# Patient Record
Sex: FEMALE | Race: BLACK OR AFRICAN AMERICAN | NOT HISPANIC OR LATINO | Employment: FULL TIME | URBAN - METROPOLITAN AREA
[De-identification: names, ages, dates, MRNs, and addresses within clinical notes are randomized per-mention and may not be internally consistent; named-entity substitution may affect disease eponyms.]

---

## 2024-01-02 ENCOUNTER — TELEPHONE (OUTPATIENT)
Dept: RESEARCH | Facility: CLINIC | Age: 49
End: 2024-01-02
Payer: COMMERCIAL

## 2024-01-02 NOTE — TELEPHONE ENCOUNTER
CRC contacted Pt regarding 1st appointment cancellation request. Pt asked to speak with HC later than 10 am on 1/5/2024 as she has a meeting at work. Pt, CRC, and HC Norma worked out a later appt time to accommodate Pt so appointment would not need to be cancelled. Pt confirmed new time.

## 2024-01-05 ENCOUNTER — PATIENT OUTREACH (OUTPATIENT)
Dept: RESEARCH | Facility: CLINIC | Age: 49
End: 2024-01-05
Payer: COMMERCIAL

## 2024-01-05 DIAGNOSIS — Z00.6 RESEARCH SUBJECT: ICD-10-CM

## 2024-01-05 NOTE — PROGRESS NOTES
01/05/2024  11:28 AM    Patient Name Reina Ortiz   Appointment Department [facility]NAG[facility]  Visit Type Audio (Virtual visit)    Clinic Weights   Wt Readings from Last 3 Encounters:   11/20/23 1141 90.7 kg (199 lb 15.3 oz)   10/10/23 0911 88.9 kg (196 lb)   07/28/23 1513 90.8 kg (200 lb 2.8 oz)     Last Reported Weights No data was found      [facility] INTERVENTION CONTACT:   Method of contact:  Phone Call   or Participant-Initiated Contact?:  -initiated contact  Type of intervention Contact:  Scheduled Session  Did the participant attend session?: Yes    Was the patient called within 15 minutes of the scheduled appointment?:  Yes  Is this a make-up session?: No    Which session materials covered in session?:  Session 1  Patient Reported Weight (From External Justin):  195  Time workout: none reported.  Average Daily Steps: none reported.  Calorie Prescription::  1800  Safety Criteria Triggered:  None  Toolbox Triggered:  None  Weight Graph Stoplight Status for Dietary Adherence:  Green Light       Goals         80 <= Glucose <= 180 (pt-stated)       Blood Pressure < 130/80 (pt-stated)       Exercise at least 150 minutes per week.       HEMOGLOBIN A1C < 8.0       Increase walking/biking to 10 minutes per day (pt-stated)       Take at least one BP reading per week at various times of the day       Weight (lb) < 90.7 kg (200 lb) (pt-stated)              Additional Notes:    Ppt only able to meet for 30 minutes today - spent the majority of the call discussing the meal plan. Will go over the weight graph, stop lights, and tool box next week. Ppt shared her motivation for joining the study - has had diabetes for 26 years and wants to lose weight to better control her blood sugar. She is also motivated to get off some medication and overall be healthier. Currently has a [employer] fitness watch that tracks her steps. We will review them next week to set a daily step goal. Plans to go grocery  shopping for PCFs in the new few days.           Norma Schilling MS, RD, LDN  [facility]   [phone]

## 2024-01-12 ENCOUNTER — PATIENT OUTREACH (OUTPATIENT)
Dept: RESEARCH | Facility: CLINIC | Age: 49
End: 2024-01-12
Payer: COMMERCIAL

## 2024-01-12 NOTE — PROGRESS NOTES
01/12/2024  9:54 AM    Patient Name Reina Ortiz   Appointment Department [facility]  Visit Type Audio (Virtual visit)    Clinic Weights   Wt Readings from Last 3 Encounters:   11/20/23 1141 90.7 kg (199 lb 15.3 oz)   10/10/23 0911 88.9 kg (196 lb)   07/28/23 1513 90.8 kg (200 lb 2.8 oz)     Last Reported Weights No data was found      [facility] INTERVENTION CONTACT:   Method of contact:  Phone Call   or Participant-Initiated Contact?:  -initiated contact  Type of intervention Contact:  Scheduled Session  Did the participant attend session?: Yes    Was the patient called within 15 minutes of the scheduled appointment?:  Yes  Is this a make-up session?: No    Which session materials covered in session?:  Session 2  Patient Reported Weight (From External Justin):  193.1  Patient-reported weekly minutes of physical activity::  0  Average daily steps per day via Fitbit or activity tracker measurement::  4024  Calorie Prescription::  1800  Safety Criteria Triggered:  None  Toolbox Triggered:  None  Weight Graph Stoplight Status for Dietary Adherence:  Green Light       Goals         80 <= Glucose <= 180 (pt-stated)       Blood Pressure < 130/80 (pt-stated)       Exercise at least 150 minutes per week.       HEMOGLOBIN A1C < 8.0       Increase walking/biking to 10 minutes per day (pt-stated)       Take at least one BP reading per week at various times of the day       Weight (lb) < 90.7 kg (200 lb) (pt-stated)              Additional Notes:    Patient reports Doing Well. Patient is Highly Motivated with being 193.1 pounds today and aims to Get Below that weight next week. Logged into weight graph website together and reviewed the weight graph, stop lights, and toolbox.  Shared that it was challenging, but she was able to follow the PCFs this past week. Sent some time reviewing the meal plan checklist again since she messaged about needing clarification. Patient's plans for eating this week  include Packing healthy snacks and Declining unhealthy options. Ppt is going on a cruise from the 13th-17th and will not be able to bring her scale and take weights. She plans to pack single serving snacks and make healthy choices and be mindful of portion sizes of the meals served. For exercise, patient plans to continue aiming to walk at least 4,000 steps a day. Her average this week was 4,024, and she believes she can achieve this goal.               Norma Schilling, MS, RD, LDN  [facility]   [phone]

## 2024-01-26 ENCOUNTER — PATIENT OUTREACH (OUTPATIENT)
Dept: RESEARCH | Facility: CLINIC | Age: 49
End: 2024-01-26
Payer: COMMERCIAL

## 2024-01-26 NOTE — PROGRESS NOTES
01/26/2024  10:00 AM    Patient Name Reina Ortiz   Appointment Department [facility]  Visit Type Audio (Virtual visit)    Clinic Weights   Wt Readings from Last 3 Encounters:   11/20/23 1141 90.7 kg (199 lb 15.3 oz)   10/10/23 0911 88.9 kg (196 lb)   07/28/23 1513 90.8 kg (200 lb 2.8 oz)     Last Reported Weights No data was found      [facility] INTERVENTION CONTACT:   Method of contact:  Phone Call   or Participant-Initiated Contact?:  -initiated contact  Type of intervention Contact:  Scheduled Session  Did the participant attend session?: Yes    Was the patient called within 15 minutes of the scheduled appointment?:  Yes  Is this a make-up session?: No    Which session materials covered in session?:  Session 3 and Session 4  Patient Reported Weight (From External Justin):  195.6  Patient-reported weekly minutes of physical activity::  0  Average daily steps per day via Fitbit or activity tracker measurement::  3239  Calorie Prescription::  1800  Safety Criteria Triggered:  None  Toolbox Triggered:  Adherence to diet  Adherence to diet: Health  must choose at least two interventions from list::  Sole use of a structured meal plan checklist given to ppt in their first intervention session and Participant to consume only meal replacements and portion-controlled foods as prescribed  Weight Graph Stoplight Status for Dietary Adherence:  Red Light       Goals         80 <= Glucose <= 180 (pt-stated)       Blood Pressure < 130/80 (pt-stated)       Exercise at least 150 minutes per week.       HEMOGLOBIN A1C < 8.0       Increase walking/biking to 10 minutes per day (pt-stated)       Take at least one BP reading per week at various times of the day       Weight (lb) < 90.7 kg (200 lb) (pt-stated)              Additional Notes:    Patient reports Doing Well. Patient is Poorly Motivated with being 195.6 pounds today and aims to Get Below that weight next week. Ppt is aware that her  weight has gone up over the past 2 weeks. She was on a cruise and notes that her weight increase began after that. She also thinks some weight gain could be d/t menstrual cycle. Wants to follow the meal plan checklist this next week since she was off a week with the cruise. Patient's plans for eating this week include Buying pre-packaged meals, Packing healthy snacks, and Declining unhealthy options. For exercise, patient plans to continue aiming to walk about 4,000 steps. She was close to that goal several days this week - reviewed checking her smart watch periodically throughout the day to see if her steps are looking good. From there, she can determine if she needs to add in extra efforts to walk around.             Norma Schilling MS, RD, LDN  [facility]   [phone]

## 2024-01-29 DIAGNOSIS — E11.65 TYPE 2 DIABETES MELLITUS WITH HYPERGLYCEMIA, WITHOUT LONG-TERM CURRENT USE OF INSULIN: ICD-10-CM

## 2024-01-29 RX ORDER — METFORMIN HYDROCHLORIDE 1000 MG/1
1000 TABLET ORAL 2 TIMES DAILY WITH MEALS
Qty: 180 TABLET | Refills: 3 | Status: SHIPPED | OUTPATIENT
Start: 2024-01-29

## 2024-01-29 NOTE — TELEPHONE ENCOUNTER
No care due was identified.  [facility] Embedded Care Due Messages. Reference number: [id number].   1/29/2024 6:47:40 AM CST

## 2024-01-29 NOTE — TELEPHONE ENCOUNTER
Refill Routing Note   Medication(s) are not appropriate for processing by [facility] for the following reason(s):        No active prescription written by provider    [facility] action(s):  Defer        Medication Therapy Plan:  on the med list 24.      Appointments  past 12m or future 3m with PCP    Date Provider   Last Visit   2023 Myles Sanderson MD   Next Visit   2024 Myles Sanderson MD   ED visits in past 90 days: 0        Note composed:12:54 PM 2024

## 2024-01-31 ENCOUNTER — PATIENT MESSAGE (OUTPATIENT)
Dept: OBSTETRICS AND GYNECOLOGY | Facility: CLINIC | Age: 49
End: 2024-01-31
Payer: COMMERCIAL

## 2024-02-02 ENCOUNTER — PATIENT OUTREACH (OUTPATIENT)
Dept: RESEARCH | Facility: CLINIC | Age: 49
End: 2024-02-02
Payer: COMMERCIAL

## 2024-02-02 NOTE — PROGRESS NOTES
02/02/2024  9:59 AM    Patient Name Reina Ortiz   Appointment Department [facility]  Visit Type Audio (Virtual visit)    Clinic Weights   Wt Readings from Last 3 Encounters:   11/20/23 1141 90.7 kg (199 lb 15.3 oz)   10/10/23 0911 88.9 kg (196 lb)   07/28/23 1513 90.8 kg (200 lb 2.8 oz)     Last Reported Weights No data was found      [facility] INTERVENTION CONTACT:   Method of contact:  Phone Call   or Participant-Initiated Contact?:  -initiated contact  Type of intervention Contact:  Scheduled Session  Did the participant attend session?: Yes    Was the patient called within 15 minutes of the scheduled appointment?:  Yes  Is this a make-up session?: No    Which session materials covered in session?:  Session 5  Patient Reported Weight (From External Justin):  192  Patient-reported weekly minutes of physical activity::  0  Average daily steps per day via Fitbit or activity tracker measurement::  3967  Calorie Prescription::  1800  Safety Criteria Triggered:  None  Toolbox Triggered:  None  Weight Graph Stoplight Status for Dietary Adherence:  Yellow Light - In the Zone       Goals         80 <= Glucose <= 180 (pt-stated)       Blood Pressure < 130/80 (pt-stated)       Exercise at least 150 minutes per week.       HEMOGLOBIN A1C < 8.0       Increase walking/biking to 10 minutes per day (pt-stated)       Take at least one BP reading per week at various times of the day       Weight (lb) < 90.7 kg (200 lb) (pt-stated)              Additional Notes:    Patient reports Doing Well. Patient is Highly Motivated with being 192 pounds today and aims to Get Below that weight next week. Overall lost 3.6 pounds this past week! Ppt plans to continue following the meal plan checklist this week. Patient's plans for eating this week include Buying pre-packaged meals, Packing healthy snacks, and Declining unhealthy options. For exercise, patient plans to continue aiming to walk 4,000 steps a day.  She reached this goal for 4 out of the 6 days she tracked her steps. Wants to continue with this goal and consider adding in walking videos after next week.            Norma Schilling MS, RD, LDN  [facility]   [phone]

## 2024-02-05 ENCOUNTER — PATIENT OUTREACH (OUTPATIENT)
Dept: RESEARCH | Facility: CLINIC | Age: 49
End: 2024-02-05
Payer: COMMERCIAL

## 2024-02-05 NOTE — PROGRESS NOTES
02/05/2024  9:57 AM    Patient Name Reina Ortiz   Appointment Department [facility]  Visit Type Audio (Virtual visit)    Clinic Weights   Wt Readings from Last 3 Encounters:   11/20/23 1141 90.7 kg (199 lb 15.3 oz)   10/10/23 0911 88.9 kg (196 lb)   07/28/23 1513 90.8 kg (200 lb 2.8 oz)     Last Reported Weights No data was found      [facility] INTERVENTION CONTACT:   Method of contact:  Phone Call   or Participant-Initiated Contact?:  -initiated contact  Type of intervention Contact:  Scheduled Session  Did the participant attend session?: Yes    Was the patient called within 15 minutes of the scheduled appointment?:  Yes  Is this a make-up session?: No    Which session materials covered in session?:  Session 6  Patient Reported Weight (From External Justin):  190.9  Patient-reported weekly minutes of physical activity::  0  Average daily steps per day via Fitbit or activity tracker measurement::  4000  Calorie Prescription::  1800  Safety Criteria Triggered:  None  Toolbox Triggered:  None  Weight Graph Stoplight Status for Dietary Adherence:  Green Light       Goals         80 <= Glucose <= 180 (pt-stated)       Blood Pressure < 130/80 (pt-stated)       Exercise at least 150 minutes per week.       HEMOGLOBIN A1C < 8.0       Increase walking/biking to 10 minutes per day (pt-stated)       Take at least one BP reading per week at various times of the day       Weight (lb) < 90.7 kg (200 lb) (pt-stated)              Additional Notes:    Patient reports Doing Well. Patient is Highly Motivated with being 190.9 pounds today and aims to Get Below that weight next week. She is still motivated to follow the meal plan checklist this next week. Patient's plans for eating this week include Buying pre-packaged meals, Packing healthy snacks, and Declining unhealthy options. Ppt shared that she feels confident about her ability to read the nutrition labels. Reviewed the hand estimating tips for  portion sizes. For exercise, patient plans to continue aiming for the goal of walking 4,000 steps daily. Reminded her to check her smart watch periodically to see if she needs to move more to meet that goal by the end of the day.             Norma Schilling MS, RD, LDN  [facility]   [phone]

## 2024-02-16 ENCOUNTER — PATIENT OUTREACH (OUTPATIENT)
Dept: RESEARCH | Facility: CLINIC | Age: 49
End: 2024-02-16
Payer: COMMERCIAL

## 2024-02-16 NOTE — PROGRESS NOTES
02/16/2024  10:28 AM    Patient Name Reina Ortiz   Appointment Department [facility]  Visit Type Audio (Virtual visit)    Clinic Weights   Wt Readings from Last 3 Encounters:   11/20/23 1141 90.7 kg (199 lb 15.3 oz)   10/10/23 0911 88.9 kg (196 lb)   07/28/23 1513 90.8 kg (200 lb 2.8 oz)     Last Reported Weights No data was found      [facility] INTERVENTION CONTACT:   Method of contact:  Phone Call   or Participant-Initiated Contact?:  -initiated contact  Type of intervention Contact:  Scheduled Session  Did the participant attend session?: Yes    Was the patient called within 15 minutes of the scheduled appointment?:  Yes  Is this a make-up session?: No    Which session materials covered in session?:  Session 7  Patient Reported Weight (From External Justin):  188.9  Patient-reported weekly minutes of physical activity::  0  Average Daily Steps: none reported.  Calorie Prescription::  1800  Safety Criteria Triggered:  None  Toolbox Triggered:  None  Weight Graph Stoplight Status for Dietary Adherence:  Green Light       Goals         80 <= Glucose <= 180 (pt-stated)       Blood Pressure < 130/80 (pt-stated)       Exercise at least 150 minutes per week.       HEMOGLOBIN A1C < 8.0       Increase walking/biking to 10 minutes per day (pt-stated)       Take at least one BP reading per week at various times of the day       Weight (lb) < 90.7 kg (200 lb) (pt-stated)              Additional Notes:    Patient reports Doing Well. Patient is Highly Motivated with being 188.9 pounds today and aims to Get Below that weight next week. Shared that she started to incorporate cooking meals at home along with having the portion-controlled meals. Will focus on increasing her vegetable consumption since she claims to not be a fan of them (does not like cauliflower). Willing to try cooking items like asparagus and green beans in the air fryer or steamer. We also discussed importance of measuring oil  and using oil spray instead. Patient's plans for eating this week include Buying pre-packaged meals, Packing healthy snacks, and Declining unhealthy options. For exercise, patient plans to continue wearing her [employer] watch and trying to walk 4,000 steps daily. Was unable to download her step count for this session.             Norma Schilling MS, RD, LDN  [facility]   [phone]

## 2024-02-23 ENCOUNTER — PATIENT OUTREACH (OUTPATIENT)
Dept: RESEARCH | Facility: CLINIC | Age: 49
End: 2024-02-23
Payer: COMMERCIAL

## 2024-02-23 NOTE — PROGRESS NOTES
02/23/2024  10:00 AM    Patient Name Reina Ortiz   Appointment Department [facility]NAG[facility]  Visit Type Audio (Virtual visit)    Clinic Weights   Wt Readings from Last 3 Encounters:   11/20/23 1141 90.7 kg (199 lb 15.3 oz)   10/10/23 0911 88.9 kg (196 lb)   07/28/23 1513 90.8 kg (200 lb 2.8 oz)     Last Reported Weights No data was found      [facility] INTERVENTION CONTACT:   Method of contact:  Phone Call   or Participant-Initiated Contact?:  -initiated contact  Type of intervention Contact:  Scheduled Session  Did the participant attend session?: Yes    Was the patient called within 15 minutes of the scheduled appointment?:  Yes  Is this a make-up session?: No    Which session materials covered in session?:  Session 8  Patient Reported Weight (From External Justin):  188.9  Time workout: none reported.  Average daily steps per day via Fitbit or activity tracker measurement::  4631  Calorie Prescription::  1800  Safety Criteria Triggered:  None  Toolbox Triggered:  None  Weight Graph Stoplight Status for Dietary Adherence:  Green Light       Goals         80 <= Glucose <= 180 (pt-stated)       Blood Pressure < 130/80 (pt-stated)       Exercise at least 150 minutes per week.       HEMOGLOBIN A1C < 8.0       Increase walking/biking to 10 minutes per day (pt-stated)       Take at least one BP reading per week at various times of the day       Weight (lb) < 90.7 kg (200 lb) (pt-stated)              Additional Notes:    Patient reports Doing Well. Patient is Highly Motivated with being 188.9 pounds today and aims to Get Below that weight next week. Patient's plans for eating this week include Packing healthy snacks and Declining unhealthy options. Still cooking her meals at home and made an effort to incorporate more vegetables this week. Tried out asparagus, sweet potatoes, and bell peppers in the air fryer. For exercise, patient plans to increase her daily step goal from 4,000 to 4,500 steps  daily. This past week she walked over 4,500 steps 4 out of the 7 days, so we reviewed that she is capable of this new goal.             Norma Schilling MS, RD, LDN  [employer]   [phone]

## 2024-03-01 ENCOUNTER — PATIENT OUTREACH (OUTPATIENT)
Dept: RESEARCH | Facility: CLINIC | Age: 49
End: 2024-03-01
Payer: COMMERCIAL

## 2024-03-01 NOTE — PROGRESS NOTES
03/01/2024  9:56 AM    Patient Name Reina Ortiz   Appointment Department [facility]NAG[facility]  Visit Type Audio (Virtual visit)    Clinic Weights   Wt Readings from Last 3 Encounters:   11/20/23 1141 90.7 kg (199 lb 15.3 oz)   10/10/23 0911 88.9 kg (196 lb)   07/28/23 1513 90.8 kg (200 lb 2.8 oz)     Last Reported Weights No data was found      [facility] INTERVENTION CONTACT:   Method of contact:  Phone Call   or Participant-Initiated Contact?:  -initiated contact  Type of intervention Contact:  Scheduled Session  Did the participant attend session?: Yes    Was the patient called within 15 minutes of the scheduled appointment?:  Yes  Is this a make-up session?: No    Which session materials covered in session?:  Session 9  Patient Reported Weight (From External Justin):  184.7  Time workout: not reported.  Average daily steps per day via Fitbit or activity tracker measurement::  5351  Calorie Prescription::  1800  Safety Criteria Triggered:  None  Toolbox Triggered:  None  Weight Graph Stoplight Status for Dietary Adherence:  Green Light       Goals         80 <= Glucose <= 180 (pt-stated)       Blood Pressure < 130/80 (pt-stated)       Exercise at least 150 minutes per week.       HEMOGLOBIN A1C < 8.0       Increase walking/biking to 10 minutes per day (pt-stated)       Take at least one BP reading per week at various times of the day       Weight (lb) < 90.7 kg (200 lb) (pt-stated)              Additional Notes:    Patient reports Doing Well. Patient is Highly Motivated with being 184.7 pounds today and aims to Get Below that weight next week. She lost about 4 pounds this past week - contributes this to increased walking. Patient's plans for eating this week include Buying pre-packaged meals and Packing healthy snacks. She made an effort to eat more vegetables and keeps a couple portion-controlled meals for days when she is short on time. For exercise, patient plans to continue walking her  dog daily and aiming to get at least 4,500 steps. Her lowest day was 4,081 steps and highest was 6,442 steps - she walked over 4,500 steps 6 out of 7 days last week. Has noticed that she can a difference with clothes fitting more loose since losing about 10 pounds.          Norma Schilling MS, RD, LDN  [facility]   [phone]

## 2024-03-08 ENCOUNTER — PATIENT OUTREACH (OUTPATIENT)
Dept: RESEARCH | Facility: CLINIC | Age: 49
End: 2024-03-08
Payer: COMMERCIAL

## 2024-03-08 NOTE — PROGRESS NOTES
03/08/2024  9:59 AM    Patient Name Reina Ortiz   Appointment Department [facility] PROP WEIGHT MANAGEMENT  Visit Type Audio (Virtual visit)    Clinic Weights   Wt Readings from Last 3 Encounters:   11/20/23 1141 90.7 kg (199 lb 15.3 oz)   10/10/23 0911 88.9 kg (196 lb)   07/28/23 1513 90.8 kg (200 lb 2.8 oz)     Last Reported Weights No data was found      [facility] INTERVENTION CONTACT:   Method of contact:  Phone Call   or Participant-Initiated Contact?:  -initiated contact  Type of intervention Contact:  Scheduled Session  Did the participant attend session?: No    If no, please select a reason::  Other (please comment) (unknown)  Was the patient called within 15 minutes of the scheduled appointment?:  Yes  Is this a make-up session?: No    Safety Criteria Triggered:  None  Weight Graph Stoplight Status for Dietary Adherence:  Green Light       Goals         80 <= Glucose <= 180 (pt-stated)       Blood Pressure < 130/80 (pt-stated)       Exercise at least 150 minutes per week.       HEMOGLOBIN A1C < 8.0       Increase walking/biking to 10 minutes per day (pt-stated)       Take at least one BP reading per week at various times of the day       Weight (lb) < 90.7 kg (200 lb) (pt-stated)              Additional Notes:    Unable to successfully reach patient for our scheduled session. Left a voicemail and sent a missed appt text alert.          Norma Schilling MS, RD, LDN  [employer] [facility]   [phone]

## 2024-03-22 ENCOUNTER — PATIENT OUTREACH (OUTPATIENT)
Dept: RESEARCH | Facility: CLINIC | Age: 49
End: 2024-03-22
Payer: COMMERCIAL

## 2024-03-22 NOTE — PROGRESS NOTES
03/22/2024  10:01 AM    Patient Name Reina Ortiz   Appointment Department [facility]  Visit Type Audio (Virtual visit)    Clinic Weights   Wt Readings from Last 3 Encounters:   11/20/23 1141 90.7 kg (199 lb 15.3 oz)   10/10/23 0911 88.9 kg (196 lb)   07/28/23 1513 90.8 kg (200 lb 2.8 oz)     Last Reported Weights No data was found      [facility] INTERVENTION CONTACT:   Method of contact:  Phone Call   or Participant-Initiated Contact?:  -initiated contact  Type of intervention Contact:  Scheduled Session  Did the participant attend session?: Yes    Was the patient called within 15 minutes of the scheduled appointment?:  Yes  Is this a make-up session?: No    Which session materials covered in session?:  Session 10 and Session 11  Patient Reported Weight (From External Justin):  180.3  Time workout: not reported.  Average daily steps per day via Fitbit or activity tracker measurement::  5919  Calorie Prescription::  1800  Safety Criteria Triggered:  None  Toolbox Triggered:  None  Weight Graph Stoplight Status for Dietary Adherence:  Green Light       Goals         80 <= Glucose <= 180 (pt-stated)       Blood Pressure < 130/80 (pt-stated)       Exercise at least 150 minutes per week.       HEMOGLOBIN A1C < 8.0       Increase walking/biking to 10 minutes per day (pt-stated)       Take at least one BP reading per week at various times of the day       Weight (lb) < 90.7 kg (200 lb) (pt-stated)              Additional Notes:    Patient reports Doing Well. Patient is Highly Motivated with being 180.3 pounds today and aims to Get Below that weight next week. Patient's plans for eating this week include Buying pre-packaged meals, Packing healthy snacks, and Declining unhealthy options. She keeps several single serving meals in the house as a quick option if needed. For meals, she has mostly been preparing fish or shrimp with a salad or two vegetable sides - typically has fruit for a snack  as well. Shared that she rarely eats fast food and if she does, she will order a kid's meal. Today we reviewed sources of carbs and patient has sweet potatoes and oatmeal regularly. If she does have white rice, she makes it a priority to stay underneath the serving size. For exercise, patient plans to continue walking daily and reaching her step goal - reached her goal 6 out of 7 days this past week. She intentionally chino her car far away when running errands. Patient shared that she has noticed her energy levels increase, and she is feeling overall excited and motivated with her progress thus far.          Norma Schilling MS, RD, LDN  [facility]   [phone]

## 2024-04-05 ENCOUNTER — PATIENT OUTREACH (OUTPATIENT)
Dept: RESEARCH | Facility: CLINIC | Age: 49
End: 2024-04-05
Payer: COMMERCIAL

## 2024-04-05 NOTE — PROGRESS NOTES
04/05/2024  10:00 AM    Patient Name Reina Otriz   Appointment Department [facility]  Visit Type Audio (Virtual visit)    Clinic Weights   Wt Readings from Last 3 Encounters:   11/20/23 1141 90.7 kg (199 lb 15.3 oz)   10/10/23 0911 88.9 kg (196 lb)   07/28/23 1513 90.8 kg (200 lb 2.8 oz)     Last Reported Weights No data was found      [facility] INTERVENTION CONTACT:   Method of contact:  Phone Call   or Participant-Initiated Contact?:  -initiated contact  Type of intervention Contact:  Scheduled Session  Did the participant attend session?: Yes    Was the patient called within 15 minutes of the scheduled appointment?:  Yes  Is this a make-up session?: No    Which session materials covered in session?:  Session 12 and Session 13  Patient Reported Weight (From External Justin):  186.3  Patient-reported weekly minutes of physical activity::  0  Average daily steps per day via Fitbit or activity tracker measurement::  4500  Calorie Prescription::  1800  Safety Criteria Triggered:  None  Toolbox Triggered:  None  Weight Graph Stoplight Status for Dietary Adherence:  Yellow Light - In the Zone       Goals         80 <= Glucose <= 180 (pt-stated)       Blood Pressure < 130/80 (pt-stated)       Exercise at least 150 minutes per week.       HEMOGLOBIN A1C < 8.0       Increase walking/biking to 10 minutes per day (pt-stated)       Take at least one BP reading per week at various times of the day       Weight (lb) < 90.7 kg (200 lb) (pt-stated)              Additional Notes:    Patient reports Doing Well. Patient is Poorly Motivated with being 186.3 pounds today and aims to Get Below that weight next week. Patient shared that she believes her weight has trended up this past week due to starting her menstrual cycle. Stated that she has been eating jelly beans, which are one of her favorite candies. She does not keep them in the house typically but saw them during her last grocery trip and  didn't to get them. Patient's plans for eating this week include Buying pre-packaged meals, Avoiding temptation, Packing healthy snacks, and Declining unhealthy options. For exercise, patient plans to continue incorporating movement daily and aiming for her goal of 4,500 steps a day. She still wants to keep this step goal and focus on staying consistent walking this amount of steps regularly.          Norma Schilling MS, RD, LDN  [facility]   [phone]

## 2024-04-11 ENCOUNTER — OFFICE VISIT (OUTPATIENT)
Dept: URGENT CARE | Facility: CLINIC | Age: 49
End: 2024-04-11
Payer: COMMERCIAL

## 2024-04-11 VITALS
RESPIRATION RATE: 17 BRPM | HEART RATE: 87 BPM | WEIGHT: 199.94 LBS | DIASTOLIC BLOOD PRESSURE: 85 MMHG | SYSTOLIC BLOOD PRESSURE: 120 MMHG | BODY MASS INDEX: 35.43 KG/M2 | OXYGEN SATURATION: 98 % | TEMPERATURE: 98 F | HEIGHT: 63 IN

## 2024-04-11 DIAGNOSIS — M75.51 BURSITIS OF RIGHT SHOULDER: ICD-10-CM

## 2024-04-11 DIAGNOSIS — M25.511 CHRONIC RIGHT SHOULDER PAIN: Primary | ICD-10-CM

## 2024-04-11 DIAGNOSIS — G89.29 CHRONIC RIGHT SHOULDER PAIN: Primary | ICD-10-CM

## 2024-04-11 PROCEDURE — 96372 THER/PROPH/DIAG INJ SC/IM: CPT | Mod: S$GLB,,,

## 2024-04-11 PROCEDURE — 99203 OFFICE O/P NEW LOW 30 MIN: CPT | Mod: 25,S$GLB,,

## 2024-04-11 RX ORDER — KETOROLAC TROMETHAMINE 30 MG/ML
30 INJECTION, SOLUTION INTRAMUSCULAR; INTRAVENOUS ONCE
Status: COMPLETED | OUTPATIENT
Start: 2024-04-11 | End: 2024-04-11

## 2024-04-11 RX ORDER — NAPROXEN 500 MG/1
500 TABLET ORAL 2 TIMES DAILY WITH MEALS
Qty: 14 TABLET | Refills: 0 | Status: SHIPPED | OUTPATIENT
Start: 2024-04-11 | End: 2024-04-18

## 2024-04-11 RX ADMIN — KETOROLAC TROMETHAMINE 30 MG: 30 INJECTION, SOLUTION INTRAMUSCULAR; INTRAVENOUS at 03:04

## 2024-04-11 NOTE — LETTER
April 11, 2024      [facility] [address]  [place] [address]  Phone: [phone]  Fax: [phone]       Patient: Reina Ortiz   YOB: 1975  Date of Visit: 04/11/2024    To Whom It May Concern:    Timo Ortiz  was at [facility] on 04/11/2024. The patient may return to work on 4/16/24 with no restrictions. If you have any questions or concerns, or if I can be of further assistance, please do not hesitate to contact me.    Sincerely,    Dylan Chatman NP

## 2024-04-11 NOTE — PATIENT INSTRUCTIONS
Take naproxen twice a day for 7 days.  Please follow-up with ortho persists or worsens.    What care is needed at home?   Call your regular doctor to let them know you were in the ED. Make a follow-up appointment if you were told to.  Rest and protect the area that hurts. You may need to avoid certain activities like lifting or laying on your side. Use a cane, sling, or other device if it helps you.  Avoid putting pressure on the area. For example, if you have bursitis in your knee, avoid kneeling. You may want to use a cushion to pad the area.  You may want to take medicines like ibuprofen or naproxen for swelling and pain. These are nonsteroidal anti-inflammatory drugs (NSAIDS).  Use ice to help with pain. Place an ice pack or a bag of frozen vegetables wrapped in a towel over the painful part. Never put ice right on the skin. Do not leave the ice on more than 10 to 15 minutes at a time.  After the first 1 to 2 days, you may want to use heat to help with pain or muscle stiffness. Put a heating pad on your painful part for no more than 20 minutes at a time. Never go to sleep with a heating pad on, as this can cause burns.  When do I need to call the doctor?   You have a fever of 100.4°F (38°C) or higher with joint pain, swelling, or redness.  Redness around the bursitis is spreading.  Pus is draining from the area of bursitis.  You have pain that does not get better with pain medicine.  Your pain or swelling gets worse or you are having more trouble moving the joint near the bursitis.  You have new or worsening symptoms.  - Rest.    - Drink plenty of fluids.    - Acetaminophen (tylenol) or Ibuprofen (advil,motrin) as directed as needed for fever/pain. Avoid tylenol if you have a history of liver disease. Do not take ibuprofen if you have a history of GI bleeding, kidney disease, or if you take blood thinners.     - Follow up with your PCP or specialty clinic as directed in the next 1-2 weeks if not improved or as  needed.  You can call [phone] to schedule an appointment with the appropriate provider.    - Go to the ER or seek medical attention immediately if you develop new or worsening symptoms.     - You must understand that you have received an Urgent Care treatment only and that you may be released before all of your medical problems are known or treated.   - You, the patient, will arrange for follow up care as instructed.   - If your condition worsens or fails to improve we recommend that you receive another evaluation at the ER immediately or contact your PCP to discuss your concerns or return here.

## 2024-04-11 NOTE — PROGRESS NOTES
"Subjective:      Patient ID: Reina Ortiz is a 49 y.o. female.    Vitals:  height is 5' 3" (1.6 m) and weight is 90.7 kg (199 lb 15.3 oz). Her oral temperature is 98 °F (36.7 °C). Her blood pressure is 120/85 and her pulse is 87. Her respiration is 17 and oxygen saturation is 98%.     Chief Complaint: Injury (Pain in my right shoulder and elbow - Entered by patient)    49-year-old female patient presents with chronic right shoulder pain and right elbow pain ongoing for the last 4 months.  Patient states she into right shoulder and elbow has been going on for years.  Patient has been taking ibuprofen as needed for pain with no relief.  Denies any injury or trauma.  Patient states she works as a  and uses her right arm to drive.  Patient has full range of motion of but reports pain with movement.          Injury  This is a new problem. The current episode started more than 1 month ago. The problem occurs constantly. The problem has been unchanged. Associated symptoms include arthralgias. Pertinent negatives include no abdominal pain, anorexia, change in bowel habit, chest pain, chills, congestion, coughing, diaphoresis, fatigue, fever, headaches, joint swelling, myalgias, nausea, neck pain, numbness, rash, sore throat, swollen glands, urinary symptoms, vertigo, visual change, vomiting or weakness. Nothing aggravates the symptoms. She has tried NSAIDs for the symptoms. The treatment provided no relief.       Constitution: Negative for activity change, appetite change, chills, sweating, fatigue and fever.   HENT:  Negative for ear pain, ear discharge, foreign body in ear, tinnitus, hearing loss, congestion, foreign body in nose, postnasal drip, sinus pain, sinus pressure, sore throat, trouble swallowing and voice change.    Neck: Negative for neck pain, neck stiffness, painful lymph nodes and neck swelling.   Cardiovascular:  Negative for chest trauma, chest pain and leg swelling.   Eyes:  Negative for eye " trauma, foreign body in eye, eye discharge, eye itching and eye pain.   Respiratory:  Negative for sleep apnea, chest tightness, cough and sputum production.    Gastrointestinal:  Negative for abdominal trauma, abdominal pain, abdominal bloating, history of abdominal surgery, nausea and vomiting.   Endocrine: hair loss, cold intolerance and heat intolerance.   Genitourinary:  Negative for dysuria, frequency, urgency, urine decreased and flank pain.   Musculoskeletal:  Positive for joint pain. Negative for pain, trauma, joint swelling, abnormal ROM of joint, arthritis, gout, back pain, muscle cramps and muscle ache.   Skin:  Negative for color change, pale, rash, wound, abrasion and erythema.   Allergic/Immunologic: Negative for environmental allergies, seasonal allergies, food allergies and eczema.   Neurological:  Negative for dizziness, history of vertigo, light-headedness, passing out, headaches, disorientation, altered mental status and numbness.   Hematologic/Lymphatic: Negative for swollen lymph nodes, easy bruising/bleeding, trouble clotting and history of blood clots. Does not bruise/bleed easily.   Psychiatric/Behavioral:  Negative for altered mental status, disorientation, confusion, agitation and nervous/anxious. The patient is not nervous/anxious.       Objective:     Physical Exam   Constitutional: She is oriented to person, place, and time. She appears well-developed. She is cooperative. No distress.   HENT:   Head: Normocephalic and atraumatic.   Nose: Nose normal.   Mouth/Throat: Oropharynx is clear and moist and mucous membranes are normal.   Eyes: Conjunctivae and lids are normal.   Neck: Trachea normal and phonation normal. Neck supple.   Cardiovascular: Normal rate, regular rhythm, normal heart sounds and normal pulses.   Pulmonary/Chest: Effort normal and breath sounds normal.   Abdominal: Normal appearance and bowel sounds are normal. She exhibits no mass. Soft.   Musculoskeletal:          General: Tenderness present. No swelling, deformity or signs of injury.        Arms:       Right lower leg: No edema.      Left lower leg: No edema.      Comments: Localized tenderness- right shoulder/right elbow   Neurological: She is alert and oriented to person, place, and time. She has normal strength and normal reflexes. No sensory deficit.   Skin: Skin is warm, dry, intact, not diaphoretic, not pale and no rash. No bruising, No erythema and No lesion jaundice  Psychiatric: Her speech is normal and behavior is normal. Judgment and thought content normal.   Nursing note and vitals reviewed.      Assessment:     1. Chronic right shoulder pain    2. Bursitis of right shoulder        Plan:       Chronic right shoulder pain  -     ketorolac injection 30 mg  -     naproxen (NAPROSYN) 500 MG tablet; Take 1 tablet (500 mg total) by mouth 2 (two) times daily with meals. for 7 days  Dispense: 14 tablet; Refill: 0  -     Ambulatory referral/consult to Orthopedics    Bursitis of right shoulder  -     naproxen (NAPROSYN) 500 MG tablet; Take 1 tablet (500 mg total) by mouth 2 (two) times daily with meals. for 7 days  Dispense: 14 tablet; Refill: 0

## 2024-04-12 ENCOUNTER — TELEPHONE (OUTPATIENT)
Dept: SPORTS MEDICINE | Facility: CLINIC | Age: 49
End: 2024-04-12
Payer: COMMERCIAL

## 2024-04-12 ENCOUNTER — PATIENT OUTREACH (OUTPATIENT)
Dept: RESEARCH | Facility: CLINIC | Age: 49
End: 2024-04-12
Payer: COMMERCIAL

## 2024-04-12 DIAGNOSIS — M25.511 RIGHT SHOULDER PAIN, UNSPECIFIED CHRONICITY: Primary | ICD-10-CM

## 2024-04-12 NOTE — PROGRESS NOTES
04/12/2024  10:02 AM    Patient Name Reina Ortiz   Appointment Department [facility]  Visit Type Audio (Virtual visit)    Clinic Weights   Wt Readings from Last 3 Encounters:   04/11/24 1422 90.7 kg (199 lb 15.3 oz)   11/20/23 1141 90.7 kg (199 lb 15.3 oz)   10/10/23 0911 88.9 kg (196 lb)     Last Reported Weights No data was found      [facility] INTERVENTION CONTACT:   Method of contact:  Phone Call   or Participant-Initiated Contact?:  -initiated contact  Type of intervention Contact:  Scheduled Session  Did the participant attend session?: Yes    Was the patient called within 15 minutes of the scheduled appointment?:  Yes  Is this a make-up session?: No    Which session materials covered in session?:  Session 14  Patient Reported Weight (From External Justin):  185.4  Time workout: not reported.  Average daily steps per day via Fitbit or activity tracker measurement::  5679  Calorie Prescription::  1800  Safety Criteria Triggered:  None  Toolbox Triggered:  None  Weight Graph Stoplight Status for Dietary Adherence:  Yellow Light - In the Zone       Goals         80 <= Glucose <= 180 (pt-stated)       Blood Pressure < 130/80 (pt-stated)       Exercise at least 150 minutes per week.       HEMOGLOBIN A1C < 8.0       Increase walking/biking to 10 minutes per day (pt-stated)       Take at least one BP reading per week at various times of the day       Weight (lb) < 90.7 kg (200 lb) (pt-stated)              Additional Notes:    Patient reports Doing Well. Patient is Highly Motivated with being 185.4 pounds today and aims to Get Below that weight next week. Patient's plans for eating this week include Avoiding temptation, Packing healthy snacks, and Declining unhealthy options. Did not have any jelly beans this past week. Has made it a priority to incorporate more fruits and vegetables into her meals and snacks. Also making smoothies (avocado or banana with berries and water) and has  single serving packs of vanilla wafers. Wants to focus on increasing her water intake. Since she prefers having cold water, patient decided to try freezing several plastic water bottles and bring these with her for the work day. For exercise, patient plans to continue aiming to walk at least 4,500 steps a day. She continues to exceed this goal regularly and her average daily step count increased by about 1,000 steps this week. Shared that she would like to continue with this goal until she is off for summer vacation. At that point, she feels she may have the time to increase her movement.          Norma Schilling, MS, RD, LDN  [facility]   [phone]

## 2024-04-15 ENCOUNTER — OFFICE VISIT (OUTPATIENT)
Dept: SPORTS MEDICINE | Facility: CLINIC | Age: 49
End: 2024-04-15
Payer: COMMERCIAL

## 2024-04-15 VITALS — WEIGHT: 189.5 LBS | HEIGHT: 63 IN | BODY MASS INDEX: 33.58 KG/M2

## 2024-04-15 DIAGNOSIS — M75.101 ROTATOR CUFF SYNDROME OF RIGHT SHOULDER: Primary | ICD-10-CM

## 2024-04-15 DIAGNOSIS — M25.511 CHRONIC RIGHT SHOULDER PAIN: ICD-10-CM

## 2024-04-15 DIAGNOSIS — G89.29 CHRONIC RIGHT SHOULDER PAIN: ICD-10-CM

## 2024-04-15 PROCEDURE — 4010F ACE/ARB THERAPY RXD/TAKEN: CPT | Mod: CPTII,S$GLB,, | Performed by: STUDENT IN AN ORGANIZED HEALTH CARE EDUCATION/TRAINING PROGRAM

## 2024-04-15 PROCEDURE — 1160F RVW MEDS BY RX/DR IN RCRD: CPT | Mod: CPTII,S$GLB,, | Performed by: STUDENT IN AN ORGANIZED HEALTH CARE EDUCATION/TRAINING PROGRAM

## 2024-04-15 PROCEDURE — 99999 PR PBB SHADOW E&M-EST. PATIENT-LVL III: CPT | Mod: PBBFAC,,, | Performed by: STUDENT IN AN ORGANIZED HEALTH CARE EDUCATION/TRAINING PROGRAM

## 2024-04-15 PROCEDURE — 99204 OFFICE O/P NEW MOD 45 MIN: CPT | Mod: S$GLB,,, | Performed by: STUDENT IN AN ORGANIZED HEALTH CARE EDUCATION/TRAINING PROGRAM

## 2024-04-15 PROCEDURE — 3008F BODY MASS INDEX DOCD: CPT | Mod: CPTII,S$GLB,, | Performed by: STUDENT IN AN ORGANIZED HEALTH CARE EDUCATION/TRAINING PROGRAM

## 2024-04-15 PROCEDURE — 1159F MED LIST DOCD IN RCRD: CPT | Mod: CPTII,S$GLB,, | Performed by: STUDENT IN AN ORGANIZED HEALTH CARE EDUCATION/TRAINING PROGRAM

## 2024-04-15 NOTE — PROGRESS NOTES
Subjective:          Chief Complaint: Reina Ortiz is a 49 y.o. female who had concerns including Pain of the Right Shoulder.    CC:  right Shoulder Pain    HPI:    Reina Ortiz is a 49 y.o. female RHD  that presents for evaluation for her right shoulder pain. She notes that her pain has been presents for about 8 years after her shoulder was pulled on by her dog on a leash. She complains of pain over the anterior and lateral aspect of the right shoulder.  The pain increases with raising her arm above shoulder level as well as reaching out in front or behind her.  She notes that she has completed several months formal physical therapy in the past with little relief. She denies any instability. She notes having increased pain at night and difficulty sleeping.       Dominant Hand: Right    Occupation:     SSV: 50%    Night Pain? Yes    Interfere with ADLs? Yes      Past Medical History:   Diagnosis Date    Diabetes mellitus type II     Hyperlipidemia     Obesity        Current Outpatient Medications on File Prior to Visit   Medication Sig Dispense Refill    b complex vitamins tablet Take 1 tablet by mouth once daily.      cholecalciferol, vitamin D3, (VITAMIN D3) 50 mcg (2,000 unit) Cap Take 1 capsule by mouth once daily.      empagliflozin (JARDIANCE) 10 mg tablet Take 1 tablet (10 mg total) by mouth once daily. 90 tablet 3    flash glucose sensor (FREESTYLE EZEQUIEL 14 DAY SENSOR) Kit 1 Units by Misc.(Non-Drug; Combo Route) route Daily. 1 kit 1    metFORMIN (GLUCOPHAGE) 1000 MG tablet Take 1 tablet (1,000 mg total) by mouth 2 (two) times daily with meals. 180 tablet 3    naproxen (NAPROSYN) 500 MG tablet Take 1 tablet (500 mg total) by mouth 2 (two) times daily with meals. for 7 days 14 tablet 0    pravastatin (PRAVACHOL) 10 MG tablet Take 1 tablet (10 mg total) by mouth every evening. 90 tablet 2    glipiZIDE (GLUCOTROL) 5 MG tablet Take 1 tablet (5 mg total)  by mouth 2 (two) times daily with meals. 180 tablet 1    lisinopriL-hydrochlorothiazide (PRINZIDE,ZESTORETIC) 10-12.5 mg per tablet Take 1 tablet by mouth once daily. 90 tablet 2     No current facility-administered medications on file prior to visit.       Past Surgical History:   Procedure Laterality Date    CHOLECYSTECTOMY  May 2018    May 2018    COLONOSCOPY N/A 5/13/2022    Procedure: COLONOSCOPY;  Surgeon: Thiago Fowler MD;  Location: [facility] ENDO;  Service: Endoscopy;  Laterality: N/A;    GANGLION CYST EXCISION Left 1980's    wrist    LAPAROSCOPIC CHOLECYSTECTOMY N/A 05/29/2018    Procedure: CHOLECYSTECTOMY-LAPAROSCOPIC;  Surgeon: Adriano Terry MD;  Location: [facility] OR;  Service: General;  Laterality: N/A;  VIDEO       Family History   Problem Relation Name Age of Onset    Psoriasis Sister Na     Cancer Sister Na     Diabetes Mother Na     Hyperlipidemia Mother Na     Hypertension Mother Na     Cataracts Mother Na     Arthritis Mother Na     Heart disease Mother Na     Heart disease Father Na     Diabetes Father Na     Stroke Father Na     Hypertension Father Na     Cataracts Father Na     Cancer Paternal Grandfather Na     Eczema Son      Blindness Maternal Grandfather      Heart disease Paternal Grandmother Na     Heart disease Paternal Uncle Na     Amblyopia Neg Hx      Glaucoma Neg Hx      Macular degeneration Neg Hx      Retinal detachment Neg Hx      Strabismus Neg Hx         Social History     Socioeconomic History    Marital status: Single    Number of children: 2   Occupational History    Occupation:    Tobacco Use    Smoking status: Never    Smokeless tobacco: Never   Substance and Sexual Activity    Alcohol use: No    Drug use: No    Sexual activity: Yes     Partners: Male     Birth control/protection: Condom, None     Social Determinants of Health     Financial Resource Strain: Low Risk  (8/15/2019)    Overall Financial Resource Strain (CARDIA)      Difficulty of Paying Living Expenses: Not hard at all   Food Insecurity: No Food Insecurity (8/15/2019)    Hunger Vital Sign     Worried About Running Out of Food in the Last Year: Never true     Ran Out of Food in the Last Year: Never true   Transportation Needs: No Transportation Needs (8/15/2019)    PRAPARE - Transportation     Lack of Transportation (Medical): No     Lack of Transportation (Non-Medical): No   Physical Activity: Unknown (7/14/2020)    Exercise Vital Sign     Days of Exercise per Week: 3 days   Stress: Stress Concern Present (7/14/2020)    [employer] [place] [employer] - Occupational Stress Questionnaire     Feeling of Stress : Rather much   Social Connections: Unknown (7/14/2020)    Social Connection and Isolation Panel [NHANES]     Frequency of Social Gatherings with Friends and Family: Patient declined     Marital Status: Patient declined         Review of Systems   Constitutional: Negative.   HENT: Negative.     Eyes: Negative.    Cardiovascular: Negative.    Respiratory: Negative.     Endocrine: Negative.    Hematologic/Lymphatic: Negative.    Skin: Negative.    Musculoskeletal:  Positive for joint pain (right shoulder) and muscle weakness. Negative for arthritis, falls, myalgias, neck pain and stiffness.   Neurological: Negative.    Psychiatric/Behavioral: Negative.     Allergic/Immunologic: Negative.                    Objective:        General: Reina is well-developed, well-nourished, appears stated age, in no acute distress, alert and oriented to time, place and person.     General    Nursing note and vitals reviewed.  Constitutional: She is oriented to person, place, and time. She appears well-developed and well-nourished. No distress.   HENT:   Head: Normocephalic and atraumatic.   Nose: Nose normal.   Eyes: EOM are normal.   Cardiovascular:  Intact distal pulses.            Pulmonary/Chest: Effort normal. No respiratory distress.   Neurological: She is alert and  oriented to person, place, and time.   Psychiatric: She has a normal mood and affect. Her behavior is normal. Judgment and thought content normal.         Right Shoulder Exam     Inspection/Observation   Swelling: absent  Bruising: absent  Scars: absent  Deformity: absent  Scapular Winging: absent  Scapular Dyskinesia: negative  Atrophy: absent    Tenderness   The patient is tender to palpation of the greater tuberosity, biceps tendon and acromioclavicular joint.    Range of Motion   Active abduction:  170   Passive abduction:  170   Forward Flexion:  180   Forward Elevation: 180    Tests & Signs   Cross arm: positive  P[place]agua test: positive  Impingement: negative  Lift Off Sign: positive  Belly Press: positive  Active Compression Test ([place]'s Sign): positive    Other   Sensation: normal    Left Shoulder Exam   Left shoulder exam is normal.    Inspection/Observation   Swelling: absent  Bruising: absent  Scars: absent  Deformity: absent  Scapular Winging: absent  Scapular Dyskinesia: negative  Atrophy: absent    Tenderness   The patient is experiencing no tenderness.     Range of Motion   Active abduction:  170   Passive abduction:  170   Forward Flexion:  180   Forward Elevation: 180  External Rotation 0 degrees:  60   Internal rotation 0 degrees:  Mid thoracic     Other   Sensation: normal       Muscle Strength   Right Upper Extremity   Shoulder Abduction: 5/5   Shoulder Internal Rotation: 5/5   Shoulder External Rotation: 5/5   Supraspinatus: 5/5   Subscapularis: 5/5   Biceps: 5/5   Left Upper Extremity  Shoulder Abduction: 5/5   Shoulder Internal Rotation: 5/5   Shoulder External Rotation: 5/5   Supraspinatus: 5/5   Subscapularis: 5/5   Biceps: 5/5     Vascular Exam     Right Pulses      Radial:                    2+      Left Pulses      Radial:                    2+      Capillary Refill  Right Hand: normal capillary refill  Left Hand: normal capillary refill        Imaging:  X-rays of the right shoulder  from 04/15/2024 personally viewed by me on that day these include AP, Grashey, scapular Y, axillary.  Glenohumeral joint reduced.  No fracture.  Mild arthritic changes in the acromioclavicular joint, none in the glenohumeral joint.  No other bony abnormality.        Assessment:     Reina Ortiz is a 49 y.o. female with right shoulder pain consistent with rotator cuff etiology  Encounter Diagnoses   Name Primary?    Chronic right shoulder pain     Rotator cuff syndrome of right shoulder Yes          Plan:       The diagnosis treatment options were discussed with the patient all her questions were answered.  Showed her the x-rays and reviewed the findings with her.  Given her persistent symptoms, we will obtain an MRI to evaluate the integrity the rotator cuff.  Return to clinic after to discuss the findings and treatment options.

## 2024-04-19 ENCOUNTER — PATIENT OUTREACH (OUTPATIENT)
Dept: RESEARCH | Facility: CLINIC | Age: 49
End: 2024-04-19
Payer: COMMERCIAL

## 2024-04-19 NOTE — PROGRESS NOTES
04/19/2024  10:29 AM    Patient Name Reina Ortiz   Appointment Department [facility]  Visit Type Audio (Virtual visit)    Clinic Weights   Wt Readings from Last 3 Encounters:   04/15/24 0959 86 kg (189 lb 7.8 oz)   04/11/24 1422 90.7 kg (199 lb 15.3 oz)   11/20/23 1141 90.7 kg (199 lb 15.3 oz)     Last Reported Weights No data was found      [facility] INTERVENTION CONTACT:   Method of contact:  Phone Call   or Participant-Initiated Contact?:  -initiated contact  Type of intervention Contact:  Scheduled Session  Did the participant attend session?: Yes    Was the patient called within 15 minutes of the scheduled appointment?:  Yes  Is this a make-up session?: No    Which session materials covered in session?:  Session 15  Patient Reported Weight (From External Justin):  178.4  Time workout: not reported.  Average daily steps per day via Fitbit or activity tracker measurement::  5925  Calorie Prescription::  1800  Safety Criteria Triggered:  None  Toolbox Triggered:  None  Weight Graph Stoplight Status for Dietary Adherence:  Green Light       Goals         80 <= Glucose <= 180 (pt-stated)       Blood Pressure < 130/80 (pt-stated)       Exercise at least 150 minutes per week.       HEMOGLOBIN A1C < 8.0       Increase walking/biking to 10 minutes per day (pt-stated)       Take at least one BP reading per week at various times of the day       Weight (lb) < 90.7 kg (200 lb) (pt-stated)              Additional Notes:    Patient reports Doing Well. Patient is Highly Motivated with being 178.4 pounds today and aims to Get Below that weight next week. Believes that her weight went down about 8 pounds since she got off of her menstrual cycle. Patient's plans for eating this week include Packing healthy snacks and Declining unhealthy options. No changes to eating habits but did forget to freeze water bottles and bring them to work - still wants to try this out next week to increase her water  intake. For exercise, patient plans to continue staying active daily and walking at least 4,500 steps a day. Her daily step average increased from 5,679 to 5,925 steps this past week. We kept this call brief since patient was up at 4am and was about to take a nap before our session.      Goals:    - Continue to walk at least 4,500 steps a day     - Freeze a couple water bottles and bring these with you to work. Leave a reminder by the front door or set a reminder on your phone to grab the water bottles before leaving!          Norma Schilling, MS, RD, LDN  [facility]   [phone]

## 2024-04-23 ENCOUNTER — PATIENT MESSAGE (OUTPATIENT)
Dept: RESEARCH | Facility: CLINIC | Age: 49
End: 2024-04-23
Payer: COMMERCIAL

## 2024-04-23 DIAGNOSIS — I10 ESSENTIAL HYPERTENSION: ICD-10-CM

## 2024-04-23 RX ORDER — LISINOPRIL AND HYDROCHLOROTHIAZIDE 10; 12.5 MG/1; MG/1
1 TABLET ORAL
Qty: 90 TABLET | Refills: 1 | Status: SHIPPED | OUTPATIENT
Start: 2024-04-23

## 2024-04-23 NOTE — TELEPHONE ENCOUNTER
Care Due:                  Date            Visit Type   Department     Provider  --------------------------------------------------------------------------------                                EP -                              PRIMARY      [facility]  Last Visit: 11-      CARE ([facility])   PENNY Sanderson                              EP -                              PRIMARY      [facility]  Next Visit: 05-      CARE ([facility])   PENNY Sanderson                                                            Last  Test          Frequency    Reason                     Performed    Due Date  --------------------------------------------------------------------------------    HBA1C.......  6 months...  empagliflozin, glipiZIDE,   11- 05-                             metFORMIN................    [employer] Embedded Care Due Messages. Reference number: [id number].   4/23/2024 1:31:15 PM CDT

## 2024-04-24 NOTE — TELEPHONE ENCOUNTER
Provider Staff:  Action required for this patient    Requires labs      Please see care gap opportunities below in Care Due Message.    Thanks!  [facility]     Appointments      Date Provider   Last Visit   11/20/2023 Myles Sanderson MD   Next Visit   5/29/2024 Myles Sanderson MD     Refill Decision Note   Reina Ortiz  is requesting a refill authorization.  Brief Assessment and Rationale for Refill:  Approve     Medication Therapy Plan:         Comments:     Note composed:11:41 PM 04/23/2024

## 2024-04-26 ENCOUNTER — PATIENT OUTREACH (OUTPATIENT)
Dept: RESEARCH | Facility: CLINIC | Age: 49
End: 2024-04-26
Payer: COMMERCIAL

## 2024-04-26 NOTE — PROGRESS NOTES
04/26/2024  9:57 AM    Patient Name Reina Ortiz   Appointment Department [facility]  Visit Type Audio (Virtual visit)    Clinic Weights   Wt Readings from Last 3 Encounters:   04/15/24 0959 86 kg (189 lb 7.8 oz)   04/11/24 1422 90.7 kg (199 lb 15.3 oz)   11/20/23 1141 90.7 kg (199 lb 15.3 oz)     Last Reported Weights No data was found      [facility] INTERVENTION CONTACT:   Method of contact:  Phone Call   or Participant-Initiated Contact?:  -initiated contact  Type of intervention Contact:  Scheduled Session  Did the participant attend session?: Yes    Was the patient called within 15 minutes of the scheduled appointment?:  Yes  Is this a make-up session?: No    Which session materials covered in session?:  Session 16  Patient Reported Weight (From External Justin):  178.8  Time workout: not reported.  Average daily steps per day via Fitbit or activity tracker measurement::  6476  Calorie Prescription::  1800  Safety Criteria Triggered:  None  Toolbox Triggered:  None  Weight Graph Stoplight Status for Dietary Adherence:  Yellow Light - In the Zone       Goals         80 <= Glucose <= 180 (pt-stated)       Blood Pressure < 130/80 (pt-stated)       Exercise at least 150 minutes per week.       HEMOGLOBIN A1C < 8.0       Increase walking/biking to 10 minutes per day (pt-stated)       Take at least one BP reading per week at various times of the day       Weight (lb) < 90.7 kg (200 lb) (pt-stated)              Additional Notes:    Patient reports Doing Well. Patient is Highly Motivated with being 178.8 pounds today and aims to Get Below that weight next week. Patient's plans for eating this week include Buying pre-packaged meals, Packing healthy snacks, and Declining unhealthy options. She did remember to freeze water bottles and bring them to work. She states that it is not many water bottles but is definitely more than she has been drinking regularly. For exercise, patient plans to  continue her routine of walking a minimum of 4,500 steps daily. She does not wish to increase this step goal and enjoys feeling accomplished that she is able to continue exceeding this goal on a daily basis - typically walks anywhere from 5,000 to over 9,000 steps. Her daily step average also continues to increase each week!          Norma Schilling MS, RD, LDN  [facility]   [phone]

## 2024-05-03 ENCOUNTER — PATIENT OUTREACH (OUTPATIENT)
Dept: RESEARCH | Facility: CLINIC | Age: 49
End: 2024-05-03
Payer: COMMERCIAL

## 2024-05-03 NOTE — PROGRESS NOTES
05/03/2024  9:59 AM    Patient Name Reina Ortiz   Appointment Department [facility]  Visit Type Audio (Virtual visit)    Clinic Weights   Wt Readings from Last 3 Encounters:   04/15/24 0959 86 kg (189 lb 7.8 oz)   04/11/24 1422 90.7 kg (199 lb 15.3 oz)   11/20/23 1141 90.7 kg (199 lb 15.3 oz)     Last Reported Weights No data was found      [facility] INTERVENTION CONTACT:   Method of contact:  Phone Call   or Participant-Initiated Contact?:  -initiated contact  Type of intervention Contact:  Scheduled Session  Did the participant attend session?: Yes    Was the patient called within 15 minutes of the scheduled appointment?:  Yes  Is this a make-up session?: No    Which session materials covered in session?:  Session 17  Patient Reported Weight (From External Justin):  179.89  Average daily steps per day via Fitbit or activity tracker measurement::  5760  Calorie Prescription::  1800  Safety Criteria Triggered:  None  Toolbox Triggered:  None  Weight Graph Stoplight Status for Dietary Adherence:  Green Light       Goals         80 <= Glucose <= 180 (pt-stated)       Blood Pressure < 130/80 (pt-stated)       Exercise at least 150 minutes per week.       HEMOGLOBIN A1C < 8.0       Increase walking/biking to 10 minutes per day (pt-stated)       Take at least one BP reading per week at various times of the day       Weight (lb) < 90.7 kg (200 lb) (pt-stated)              Additional Notes:    Patient reports Doing Well. She states that she was successful with her goals this week and plans to work on maintaining these. Patient is Highly Motivated with being 179 pounds today and aims to Get Below that weight next week. Patient's plans for eating this week include Buying pre-packaged meals, Avoiding temptation, and Packing healthy snacks. Patient also plans to continue working on drinking enough water. For exercise, patient plans to continue tracking and meeting her step  goal.    Goals:  Continue walking at least 4500 steps daily - keep up the great work!  Continue to work on drinking plenty of water    Lety Neville, MS, RD, LDN, [facility]  [facility]

## 2024-05-10 ENCOUNTER — PATIENT OUTREACH (OUTPATIENT)
Dept: RESEARCH | Facility: CLINIC | Age: 49
End: 2024-05-10
Payer: COMMERCIAL

## 2024-05-10 NOTE — PROGRESS NOTES
05/10/2024  10:02 AM    Patient Name Reina Ortiz   Appointment Department [facility]  Visit Type Audio (Virtual visit)    Clinic Weights   Wt Readings from Last 3 Encounters:   04/15/24 0959 86 kg (189 lb 7.8 oz)   04/11/24 1422 90.7 kg (199 lb 15.3 oz)   11/20/23 1141 90.7 kg (199 lb 15.3 oz)     Last Reported Weights No data was found      [facility] INTERVENTION CONTACT:   Method of contact:  Phone Call   or Participant-Initiated Contact?:  -initiated contact  Type of intervention Contact:  Scheduled Session  Did the participant attend session?: Yes    Was the patient called within 15 minutes of the scheduled appointment?:  Yes  Is this a make-up session?: No    Which session materials covered in session?:  Session 18  Patient Reported Weight (From External Justin):  176.81  Average daily steps per day via Fitbit or activity tracker measurement::  5500  Calorie Prescription::  1800  Safety Criteria Triggered:  None  Toolbox Triggered:  None  Weight Graph Stoplight Status for Dietary Adherence:  Green Light       Goals         80 <= Glucose <= 180 (pt-stated)       Blood Pressure < 130/80 (pt-stated)       Exercise at least 150 minutes per week.       HEMOGLOBIN A1C < 8.0       Increase walking/biking to 10 minutes per day (pt-stated)       Take at least one BP reading per week at various times of the day       Weight (lb) < 90.7 kg (200 lb) (pt-stated)              Additional Notes:    Patient reports Doing Well. Patient is Highly Motivated with being 176 pounds today and aims to Get Below that weight next week. Patient's plans for eating this week include Buying pre-packaged meals, Avoiding temptation, Packing healthy snacks, and Other drinking plenty of water . For exercise, patient plans to work on adding more movement to her day. Patient states that she feels she needs to be more active, but does not have enough time - discussed ways to add movement to her other daily  routines.    Goals:  Work on adding movement to your other daily routines  Keep up the great work drinking plenty of water    Lety Neville, MS, RD, LDN, [facility]  [employer] [facility]

## 2024-05-16 ENCOUNTER — PATIENT OUTREACH (OUTPATIENT)
Dept: RESEARCH | Facility: CLINIC | Age: 49
End: 2024-05-16
Payer: COMMERCIAL

## 2024-05-16 NOTE — PROGRESS NOTES
05/16/2024  9:30 AM    Patient Name Reina Ortiz   Appointment Department [facility] WEIGHT MANAGEMENT  Visit Type Audio (Virtual visit)    Clinic Weights   Wt Readings from Last 3 Encounters:   04/15/24 0959 86 kg (189 lb 7.8 oz)   04/11/24 1422 90.7 kg (199 lb 15.3 oz)   11/20/23 1141 90.7 kg (199 lb 15.3 oz)     Last Reported Weights No data was found      [facility] INTERVENTION CONTACT:   Method of contact:  Phone Call   or Participant-Initiated Contact?:  -initiated contact  Type of intervention Contact:  Scheduled Session  Did the participant attend session?: Yes    Was the patient called within 15 minutes of the scheduled appointment?:  Yes  Is this a make-up session?: No    Which session materials covered in session?:  Session 19  Patient Reported Weight (From External Justin):  178.35  Average daily steps per day via Fitbit or activity tracker measurement::  5000  Calorie Prescription::  1800  Safety Criteria Triggered:  None  Toolbox Triggered:  None  Weight Graph Stoplight Status for Dietary Adherence:  Green Light       Goals         80 <= Glucose <= 180 (pt-stated)       Blood Pressure < 130/80 (pt-stated)       Exercise at least 150 minutes per week.       HEMOGLOBIN A1C < 8.0       Increase walking/biking to 10 minutes per day (pt-stated)       Take at least one BP reading per week at various times of the day       Weight (lb) < 90.7 kg (200 lb) (pt-stated)              Additional Notes:    Patient reports Doing Well. Patient is Highly Motivated with being 178 pounds today and aims to Get Below that weight next week. Patient's plans for eating this week include Avoiding temptation, Packing healthy snacks, Declining unhealthy options, and Other drinking plenty of water . For exercise, patient plans to continue meeting her step goal.    Goals:  Keep up th great work meeting your step goal  Continue drinking plenty of water    Lety Neville, MS, RD, LDN, [facility]  [employer]

## 2024-05-24 ENCOUNTER — PATIENT OUTREACH (OUTPATIENT)
Dept: RESEARCH | Facility: CLINIC | Age: 49
End: 2024-05-24
Payer: COMMERCIAL

## 2024-05-24 NOTE — PROGRESS NOTES
05/24/2024  9:59 AM    Patient Name Reina Ortiz   Appointment Department [facility]  Visit Type Audio (Virtual visit)    Clinic Weights   Wt Readings from Last 3 Encounters:   04/15/24 0959 86 kg (189 lb 7.8 oz)   04/11/24 1422 90.7 kg (199 lb 15.3 oz)   11/20/23 1141 90.7 kg (199 lb 15.3 oz)     Last Reported Weights No data was found      [facility] INTERVENTION CONTACT:   Method of contact:  Phone Call   or Participant-Initiated Contact?:  -initiated contact  Type of intervention Contact:  Scheduled Session  Did the participant attend session?: Yes    Was the patient called within 15 minutes of the scheduled appointment?:  Yes  Is this a make-up session?: No    Which session materials covered in session?:  Session 20  Patient Reported Weight (From External Justin):  181.88  Average daily steps per day via Fitbit or activity tracker measurement::  4500  Calorie Prescription::  1800  Safety Criteria Triggered:  None  Toolbox Triggered:  Adherence to diet  Adherence to diet: Health  must choose at least two interventions from list::  Reduce portion size and Use of motivational interviewing  Weight Graph Stoplight Status for Dietary Adherence:  Yellow Light - In the Zone       Goals         80 <= Glucose <= 180 (pt-stated)       Blood Pressure < 130/80 (pt-stated)       Exercise at least 150 minutes per week.       HEMOGLOBIN A1C < 8.0       Increase walking/biking to 10 minutes per day (pt-stated)       Take at least one BP reading per week at various times of the day       Weight (lb) < 90.7 kg (200 lb) (pt-stated)              Additional Notes:    Patient reports Doing Well. Patient is Highly Motivated with being 181 pounds today and aims to Get Below that weight next week. Patient's plans for eating this week include Buying pre-packaged meals, Avoiding temptation, and Other drinking more water . For exercise, patient plans to keep meeting her step goal.    Goals:  Keep up the  great work meeting your step goal  Continue trying to drink more water    Lety Neville, MS, RD, LDN, [facility]  [employer] [facility]

## 2024-05-25 ENCOUNTER — LAB VISIT (OUTPATIENT)
Dept: LAB | Facility: HOSPITAL | Age: 49
End: 2024-05-25
Attending: FAMILY MEDICINE
Payer: COMMERCIAL

## 2024-05-25 DIAGNOSIS — E66.01 SEVERE OBESITY (BMI 35.0-39.9) WITH COMORBIDITY: ICD-10-CM

## 2024-05-25 DIAGNOSIS — E11.65 TYPE 2 DIABETES MELLITUS WITH HYPERGLYCEMIA, WITHOUT LONG-TERM CURRENT USE OF INSULIN: ICD-10-CM

## 2024-05-25 DIAGNOSIS — I10 ESSENTIAL HYPERTENSION: ICD-10-CM

## 2024-05-25 DIAGNOSIS — E78.5 HYPERLIPIDEMIA, UNSPECIFIED HYPERLIPIDEMIA TYPE: ICD-10-CM

## 2024-05-25 LAB
ALBUMIN SERPL BCP-MCNC: 4 G/DL (ref 3.5–5.2)
ALP SERPL-CCNC: 76 U/L (ref 55–135)
ALT SERPL W/O P-5'-P-CCNC: 33 U/L (ref 10–44)
ANION GAP SERPL CALC-SCNC: 7 MMOL/L (ref 8–16)
AST SERPL-CCNC: 23 U/L (ref 10–40)
BILIRUB SERPL-MCNC: 0.3 MG/DL (ref 0.1–1)
BUN SERPL-MCNC: 13 MG/DL (ref 6–20)
CALCIUM SERPL-MCNC: 9.9 MG/DL (ref 8.7–10.5)
CHLORIDE SERPL-SCNC: 100 MMOL/L (ref 95–110)
CHOLEST SERPL-MCNC: 200 MG/DL (ref 120–199)
CHOLEST/HDLC SERPL: 3.8 {RATIO} (ref 2–5)
CO2 SERPL-SCNC: 29 MMOL/L (ref 23–29)
CREAT SERPL-MCNC: 0.9 MG/DL (ref 0.5–1.4)
EST. GFR  (NO RACE VARIABLE): >60 ML/MIN/1.73 M^2
ESTIMATED AVG GLUCOSE: 157 MG/DL (ref 68–131)
GLUCOSE SERPL-MCNC: 110 MG/DL (ref 70–110)
HBA1C MFR BLD: 7.1 % (ref 4–5.6)
HDLC SERPL-MCNC: 53 MG/DL (ref 40–75)
HDLC SERPL: 26.5 % (ref 20–50)
LDLC SERPL CALC-MCNC: 130.2 MG/DL (ref 63–159)
NONHDLC SERPL-MCNC: 147 MG/DL
POTASSIUM SERPL-SCNC: 3.9 MMOL/L (ref 3.5–5.1)
PROT SERPL-MCNC: 7.6 G/DL (ref 6–8.4)
SODIUM SERPL-SCNC: 136 MMOL/L (ref 136–145)
TRIGL SERPL-MCNC: 84 MG/DL (ref 30–150)

## 2024-05-25 PROCEDURE — 83036 HEMOGLOBIN GLYCOSYLATED A1C: CPT | Performed by: FAMILY MEDICINE

## 2024-05-25 PROCEDURE — 36415 COLL VENOUS BLD VENIPUNCTURE: CPT | Mod: PO | Performed by: FAMILY MEDICINE

## 2024-05-25 PROCEDURE — 80053 COMPREHEN METABOLIC PANEL: CPT | Performed by: FAMILY MEDICINE

## 2024-05-25 PROCEDURE — 80061 LIPID PANEL: CPT | Performed by: FAMILY MEDICINE

## 2024-05-25 NOTE — TELEPHONE ENCOUNTER
No care due was identified.  [facility] Embedded Care Due Messages. Reference number: [id number].   5/25/2024 10:51:18 AM CDT

## 2024-05-26 RX ORDER — PRAVASTATIN SODIUM 10 MG/1
10 TABLET ORAL NIGHTLY
Qty: 90 TABLET | Refills: 1 | Status: SHIPPED | OUTPATIENT
Start: 2024-05-26

## 2024-05-26 RX ORDER — GLIPIZIDE 5 MG/1
5 TABLET ORAL 2 TIMES DAILY WITH MEALS
Qty: 180 TABLET | Refills: 1 | Status: SHIPPED | OUTPATIENT
Start: 2024-05-26

## 2024-05-26 NOTE — TELEPHONE ENCOUNTER
Refill Decision Note   Reina Ortiz  is requesting a refill authorization.  Brief Assessment and Rationale for Refill:  Approve     Medication Therapy Plan:         Comments:     Note composed:2:46 AM 05/26/2024

## 2024-05-29 ENCOUNTER — OFFICE VISIT (OUTPATIENT)
Dept: FAMILY MEDICINE | Facility: CLINIC | Age: 49
End: 2024-05-29
Payer: COMMERCIAL

## 2024-05-29 VITALS
SYSTOLIC BLOOD PRESSURE: 90 MMHG | WEIGHT: 192.38 LBS | DIASTOLIC BLOOD PRESSURE: 70 MMHG | OXYGEN SATURATION: 99 % | HEART RATE: 74 BPM | HEIGHT: 63 IN | BODY MASS INDEX: 34.09 KG/M2

## 2024-05-29 DIAGNOSIS — E66.9 CLASS 1 OBESITY WITH BODY MASS INDEX (BMI) OF 34.0 TO 34.9 IN ADULT, UNSPECIFIED OBESITY TYPE, UNSPECIFIED WHETHER SERIOUS COMORBIDITY PRESENT: ICD-10-CM

## 2024-05-29 DIAGNOSIS — I10 ESSENTIAL HYPERTENSION: Primary | ICD-10-CM

## 2024-05-29 DIAGNOSIS — E11.65 TYPE 2 DIABETES MELLITUS WITH HYPERGLYCEMIA, WITHOUT LONG-TERM CURRENT USE OF INSULIN: ICD-10-CM

## 2024-05-29 DIAGNOSIS — E78.5 HYPERLIPIDEMIA, UNSPECIFIED HYPERLIPIDEMIA TYPE: ICD-10-CM

## 2024-05-29 PROCEDURE — 99215 OFFICE O/P EST HI 40 MIN: CPT | Mod: S$GLB,,, | Performed by: FAMILY MEDICINE

## 2024-05-29 PROCEDURE — 3078F DIAST BP <80 MM HG: CPT | Mod: CPTII,S$GLB,, | Performed by: FAMILY MEDICINE

## 2024-05-29 PROCEDURE — 3066F NEPHROPATHY DOC TX: CPT | Mod: CPTII,S$GLB,, | Performed by: FAMILY MEDICINE

## 2024-05-29 PROCEDURE — 3074F SYST BP LT 130 MM HG: CPT | Mod: CPTII,S$GLB,, | Performed by: FAMILY MEDICINE

## 2024-05-29 PROCEDURE — 1159F MED LIST DOCD IN RCRD: CPT | Mod: CPTII,S$GLB,, | Performed by: FAMILY MEDICINE

## 2024-05-29 PROCEDURE — 3061F NEG MICROALBUMINURIA REV: CPT | Mod: CPTII,S$GLB,, | Performed by: FAMILY MEDICINE

## 2024-05-29 PROCEDURE — 99999 PR PBB SHADOW E&M-EST. PATIENT-LVL IV: CPT | Mod: PBBFAC,,, | Performed by: FAMILY MEDICINE

## 2024-05-29 PROCEDURE — 3008F BODY MASS INDEX DOCD: CPT | Mod: CPTII,S$GLB,, | Performed by: FAMILY MEDICINE

## 2024-05-29 PROCEDURE — 4010F ACE/ARB THERAPY RXD/TAKEN: CPT | Mod: CPTII,S$GLB,, | Performed by: FAMILY MEDICINE

## 2024-05-29 PROCEDURE — 3051F HG A1C>EQUAL 7.0%<8.0%: CPT | Mod: CPTII,S$GLB,, | Performed by: FAMILY MEDICINE

## 2024-05-29 PROCEDURE — 1160F RVW MEDS BY RX/DR IN RCRD: CPT | Mod: CPTII,S$GLB,, | Performed by: FAMILY MEDICINE

## 2024-05-29 NOTE — PROGRESS NOTES
Subjective     Patient ID: Reina Ortiz is a 49 y.o. female.    Chief Complaint: Diabetes    49 years old female came to the clinic for pressure check.  Blood pressure today was stable.  No Chest pain, palpitation, orthopnea or PND.  Last A1c near the goal.  LDL was elevated.  Patient preferred to continue with the current dose of the statin  Along with lifestyle changes.  Patient with a BMI of 34 currently trying to lose weight.    Diabetes  She presents for her follow-up diabetic visit. She has type 2 diabetes mellitus. Her disease course has been stable. There are no hypoglycemic associated symptoms. Pertinent negatives for diabetes include no chest pain, no polydipsia, no polyphagia and no polyuria. There are no hypoglycemic complications. Symptoms are stable. There are no diabetic complications. Pertinent negatives for diabetic complications include no CVA. Risk factors for coronary artery disease include hypertension. Current diabetic treatment includes oral agent (dual therapy). She is compliant with treatment all of the time. Her weight is decreasing steadily. She is following a generally healthy diet. She has not had a previous visit with a dietitian. She participates in exercise intermittently. An ACE inhibitor/angiotensin II receptor blocker is being taken. She does not see a podiatrist.Eye exam is not current.   Hyperlipidemia  This is a chronic problem. The current episode started more than 1 year ago. Recent lipid tests were reviewed and are high. Exacerbating diseases include obesity. She has no history of chronic renal disease. There are no known factors aggravating her hyperlipidemia. Pertinent negatives include no chest pain or shortness of breath. Current antihyperlipidemic treatment includes statins. The current treatment provides mild improvement of lipids. Compliance problems include adherence to exercise and adherence to diet.  Risk factors for coronary artery disease include a  sedentary lifestyle.   Hypertension  This is a chronic problem. The current episode started more than 1 year ago. The problem is unchanged. Pertinent negatives include no chest pain, palpitations, PND or shortness of breath. There are no associated agents to hypertension. Risk factors for coronary artery disease include obesity and sedentary lifestyle. Past treatments include ACE inhibitors and diuretics. The current treatment provides significant improvement. Compliance problems include diet and exercise.  There is no history of CVA. There is no history of chronic renal disease, a hypertension causing med or a thyroid problem.     Review of Systems   Constitutional: Negative.    HENT: Negative.     Eyes: Negative.    Respiratory: Negative.  Negative for shortness of breath.    Cardiovascular:  Negative for chest pain, palpitations, leg swelling, PND and claudication.   Gastrointestinal: Negative.    Endocrine: Negative for polydipsia, polyphagia and polyuria.   Genitourinary: Negative.    Musculoskeletal: Negative.    Neurological: Negative.    Psychiatric/Behavioral: Negative.            Objective     Physical Exam  Constitutional:       General: She is not in acute distress.     Appearance: She is well-developed. She is not diaphoretic.   HENT:      Head: Normocephalic and atraumatic.      Right Ear: External ear normal.      Left Ear: External ear normal.      Nose: Nose normal.      Mouth/Throat:      Pharynx: No oropharyngeal exudate.   Eyes:      General: No scleral icterus.        Right eye: No discharge.         Left eye: No discharge.      Conjunctiva/sclera: Conjunctivae normal.      Pupils: Pupils are equal, round, and reactive to light.   Neck:      Thyroid: No thyromegaly.      Vascular: No JVD.      Trachea: No tracheal deviation.   Cardiovascular:      Rate and Rhythm: Normal rate and regular rhythm.      Heart sounds: Normal heart sounds. No murmur heard.     No friction rub. No gallop.   Pulmonary:       Effort: Pulmonary effort is normal. No respiratory distress.      Breath sounds: Normal breath sounds. No stridor. No wheezing or rales.   Chest:      Chest wall: No tenderness.   Abdominal:      General: Bowel sounds are normal. There is no distension.      Palpations: Abdomen is soft. There is no mass.      Tenderness: There is no abdominal tenderness. There is no guarding or rebound.   Musculoskeletal:         General: No tenderness. Normal range of motion.      Cervical back: Normal range of motion and neck supple.   Lymphadenopathy:      Cervical: No cervical adenopathy.   Skin:     General: Skin is warm and dry.      Coloration: Skin is not pale.      Findings: No erythema or rash.   Neurological:      Mental Status: She is alert and oriented to person, place, and time.      Cranial Nerves: No cranial nerve deficit.      Motor: No abnormal muscle tone.      Coordination: Coordination normal.      Deep Tendon Reflexes: Reflexes are normal and symmetric.   Psychiatric:         Behavior: Behavior normal.         Thought Content: Thought content normal.         Judgment: Judgment normal.            Assessment and Plan     1. Essential hypertension  -     Lipid Panel; Future; Expected date: 05/29/2024  -     Comprehensive Metabolic Panel; Future; Expected date: 05/29/2024    2. Type 2 diabetes mellitus with hyperglycemia, without long-term current use of insulin  -     Microalbumin/Creatinine Ratio, Urine; Future; Expected date: 05/29/2024  -     Lipid Panel; Future; Expected date: 05/29/2024  -     Hemoglobin A1C; Future; Expected date: 05/29/2024  -     Comprehensive Metabolic Panel; Future; Expected date: 05/29/2024    3. Class 1 obesity with body mass index (BMI) of 34.0 to 34.9 in adult, unspecified obesity type, unspecified whether serious comorbidity present  -     Lipid Panel; Future; Expected date: 05/29/2024    4. Hyperlipidemia, unspecified hyperlipidemia type  -     Lipid Panel; Future; Expected  date: 05/29/2024  -     Comprehensive Metabolic Panel; Future; Expected date: 05/29/2024        Continue monitoring blood pressure at home, low sodium diet.   Continue monitoring blood sugar at home,ADA diet.    Diet and physical activity to promote weight loss.   I spent a total of 40 minutes on the day of the visit.This includes face to face time and non-face to face time preparing to see the patient (eg, review of tests), obtaining and/or reviewing separately obtained history, documenting clinical information in the electronic or other health record, independently interpreting results and communicating results to the patient/family/caregiver, or care coordinator.        Follow up in about 6 months (around 11/29/2024), or if symptoms worsen or fail to improve.

## 2024-05-31 ENCOUNTER — PATIENT OUTREACH (OUTPATIENT)
Dept: RESEARCH | Facility: CLINIC | Age: 49
End: 2024-05-31
Payer: COMMERCIAL

## 2024-05-31 DIAGNOSIS — E11.65 TYPE 2 DIABETES MELLITUS WITH HYPERGLYCEMIA, WITHOUT LONG-TERM CURRENT USE OF INSULIN: ICD-10-CM

## 2024-05-31 RX ORDER — FLASH GLUCOSE SENSOR
1 KIT MISCELLANEOUS DAILY
Qty: 1 KIT | Refills: 1 | Status: SHIPPED | OUTPATIENT
Start: 2024-05-31

## 2024-05-31 NOTE — PROGRESS NOTES
05/31/2024  9:53 AM    Patient Name Reina Ortiz   Appointment Department [facility]  Visit Type Audio (Virtual visit)    Clinic Weights   Wt Readings from Last 3 Encounters:   05/29/24 1043 87.2 kg (192 lb 5.6 oz)   04/15/24 0959 86 kg (189 lb 7.8 oz)   04/11/24 1422 90.7 kg (199 lb 15.3 oz)     Last Reported Weights No data was found      [facility] INTERVENTION CONTACT:   Method of contact:  Phone Call   or Participant-Initiated Contact?:  -initiated contact  Type of intervention Contact:  Scheduled Session  Did the participant attend session?: Yes    Was the patient called within 15 minutes of the scheduled appointment?:  Yes  Is this a make-up session?: No    Which session materials covered in session?:  Session 21  Patient Reported Weight (From External Justin):  183.2  Average daily steps per day via Fitbit or activity tracker measurement::  5000  Calorie Prescription::  1800  Safety Criteria Triggered:  None  Toolbox Triggered:  Adherence to diet  Adherence to diet: Health  must choose at least two interventions from list::  Use of motivational interviewing, Participant to consume only meal replacements and portion-controlled foods as prescribed and Sole use of a structured meal plan checklist given to ppt in their first intervention session  Weight Graph Stoplight Status for Dietary Adherence:  Red Light       Goals         80 <= Glucose <= 180 (pt-stated)       Blood Pressure < 130/80 (pt-stated)       Exercise at least 150 minutes per week.       HEMOGLOBIN A1C < 8.0       Increase walking/biking to 10 minutes per day (pt-stated)       Take at least one BP reading per week at various times of the day       Weight (lb) < 90.7 kg (200 lb) (pt-stated)              Additional Notes:    Patient reports Doing Well. Patient is Highly Motivated with being 183 pounds today and aims to Get Below that weight next week. Patient's plans for eating this week include Buying  pre-packaged meals, Avoiding temptation, Packing healthy snacks, and Declining unhealthy options. Patient shared that she has been under some stress and has been snacking more. She states that she plans to start using more single serving and portion controlled foods to help her get back on track. For exercise, patient plans to continue meeting and increasing her step average each week.    Goals:  Focus on the meal plan - try resetting by using more single serving and portion controlled foods  Keep up the great work meeting and increasing your step average each week    Lety Neville, MS, RD, LDN, [facility]  [employer] [facility]

## 2024-05-31 NOTE — TELEPHONE ENCOUNTER
No care due was identified.  [facility] Embedded Care Due Messages. Reference number: [id number].   5/31/2024 9:21:22 AM CDT

## 2024-05-31 NOTE — TELEPHONE ENCOUNTER
Refill Routing Note   Medication(s) are not appropriate for processing by [facility] for the following reason(s):        No active prescription written by provider    [facility] action(s):  Defer      Medication Therapy Plan: Expiration Date: 04/04/24; There are no recent dispenses on file      Appointments  past 12m or future 3m with PCP    Date Provider   Last Visit   5/29/2024 Myles Sanderson MD   Next Visit   Visit date not found Myles Sanderson MD   ED visits in past 90 days: 0        Note composed:9:44 AM 05/31/2024

## 2024-06-05 ENCOUNTER — OCCUPATIONAL HEALTH (OUTPATIENT)
Dept: URGENT CARE | Facility: CLINIC | Age: 49
End: 2024-06-05

## 2024-06-05 DIAGNOSIS — Z02.89 ENCOUNTER FOR EXAMINATION REQUIRED BY DEPARTMENT OF TRANSPORTATION (DOT): Primary | ICD-10-CM

## 2024-06-05 PROCEDURE — 99499 UNLISTED E&M SERVICE: CPT | Mod: S$GLB,,, | Performed by: PHYSICIAN ASSISTANT

## 2024-06-06 ENCOUNTER — PATIENT OUTREACH (OUTPATIENT)
Dept: RESEARCH | Facility: CLINIC | Age: 49
End: 2024-06-06
Payer: COMMERCIAL

## 2024-06-06 NOTE — PROGRESS NOTES
06/06/2024  10:03 AM    [facility]   Appointment Department [facility]  Visit Type Audio (Virtual visit)    Clinic Weights   Wt Readings from Last 3 Encounters:   05/29/24 1043 87.2 kg (192 lb 5.6 oz)   04/15/24 0959 86 kg (189 lb 7.8 oz)   04/11/24 1422 90.7 kg (199 lb 15.3 oz)     Last Reported Weights No data was found      [facility] INTERVENTION CONTACT:   Method of contact:  Phone Call   or Participant-Initiated Contact?:  -initiated contact  Type of intervention Contact:  Scheduled Session  Did the participant attend session?: Yes    Was the patient called within 15 minutes of the scheduled appointment?:  Yes  Is this a make-up session?: No    Which session materials covered in session?:  Session 22  Patient Reported Weight (From External Justin):  183.64  Average daily steps per day via Fitbit or activity tracker measurement::  5000  Calorie Prescription::  1800  Safety Criteria Triggered:  None  Toolbox Triggered:  Adherence to diet  Adherence to diet: Health  must choose at least two interventions from list::  Direct dietary modification based on individual needs and Reduce portion size  Weight Graph Stoplight Status for Dietary Adherence:  Red Light       Goals         80 <= Glucose <= 180 (pt-stated)       Blood Pressure < 130/80 (pt-stated)       Exercise at least 150 minutes per week.       HEMOGLOBIN A1C < 8.0       Increase walking/biking to 10 minutes per day (pt-stated)       Take at least one BP reading per week at various times of the day       Weight (lb) < 90.7 kg (200 lb) (pt-stated)              Additional Notes:    Patient reports Doing Well. Patient is Highly Motivated with being 183 pounds today and aims to Get Below that weight next week. Patient's plans for eating this week include Avoiding temptation, Packing healthy snacks, and Other choosing lower calorie snacks, and reducing portions . Patient states that her schedule is a  lot more relaxed due to her being off for the summer. As a result she states that she has been snacking more. She recently purchased fruit for snacks. For exercise, patient plans to continue meeting her 5000 step average.    Goals:  Try lower calorie snacks like fruits and vegetables  Continue to walk at least 5000 steps per day    Lety Neville MS, RD, LDN, [facility]  [employer] [facility]

## 2024-06-14 ENCOUNTER — PATIENT OUTREACH (OUTPATIENT)
Dept: RESEARCH | Facility: CLINIC | Age: 49
End: 2024-06-14
Payer: COMMERCIAL

## 2024-06-14 NOTE — PROGRESS NOTES
06/14/2024  9:52 AM    [facility]   Appointment Department [facility]  Visit Type Audio (Virtual visit)    Clinic Weights   Wt Readings from Last 3 Encounters:   05/29/24 1043 87.2 kg (192 lb 5.6 oz)   04/15/24 0959 86 kg (189 lb 7.8 oz)   04/11/24 1422 90.7 kg (199 lb 15.3 oz)     Last Reported Weights No data was found      [facility] INTERVENTION CONTACT:   Method of contact:  Phone Call   or Participant-Initiated Contact?:  -initiated contact  Type of intervention Contact:  Scheduled Session  Did the participant attend session?: Yes    Was the patient called within 15 minutes of the scheduled appointment?:  Yes  Is this a make-up session?: No    Which session materials covered in session?:  Session 23  Patient Reported Weight (From External Justin):  187.61  Average daily steps per day via Fitbit or activity tracker measurement::  5000  Calorie Prescription::  1800  Safety Criteria Triggered:  None  Toolbox Triggered:  Adherence to diet  Adherence to diet: Health  must choose at least two interventions from list::  Use of motivational interviewing, Reduce portion size and Direct dietary modification based on individual needs  Weight Graph Stoplight Status for Dietary Adherence:  Red Light       Goals         80 <= Glucose <= 180 (pt-stated)       Blood Pressure < 130/80 (pt-stated)       Exercise at least 150 minutes per week.       HEMOGLOBIN A1C < 8.0       Increase walking/biking to 10 minutes per day (pt-stated)       Take at least one BP reading per week at various times of the day       Weight (lb) < 90.7 kg (200 lb) (pt-stated)              Additional Notes:    Patient reports Doing Well. Patient is Highly Motivated with being 187 pounds today and aims to Get Below that weight next month. Patient's plans for eating this month include Avoiding temptation, Packing healthy snacks, Declining unhealthy options, and Other reducing portions . Patient  shared that she is currently on vacation and has been eating out more and snacking a bit more. She states that for the last few days she has been more mindful of her portions and plans to continue this practice for the remainder of her trip. For exercise, patient plans to continue meeting her step goal. Patient states that while on vacation she has been meeting and some days exceeding her step goal of 4500 steps per day.    Goals:  Keep up the great work meeting your step goals  Continue to follow the meal plan and focus on portion control    Lety Neville, MS, RD, LDN, [facility]  [employer] [facility]   [phone]

## 2024-07-12 ENCOUNTER — PATIENT OUTREACH (OUTPATIENT)
Dept: RESEARCH | Facility: CLINIC | Age: 49
End: 2024-07-12
Payer: COMMERCIAL

## 2024-07-12 NOTE — PROGRESS NOTES
07/12/2024  10:01 AM    [facility]   Appointment Department [facility]  Visit Type Audio (Virtual visit)    Clinic Weights   Wt Readings from Last 3 Encounters:   05/29/24 1043 87.2 kg (192 lb 5.6 oz)   04/15/24 0959 86 kg (189 lb 7.8 oz)   04/11/24 1422 90.7 kg (199 lb 15.3 oz)     Last Reported Weights No data was found      [facility] INTERVENTION CONTACT:   Method of contact:  Phone Call   or Participant-Initiated Contact?:  -initiated contact  Type of intervention Contact:  Scheduled Session  Did the participant attend session?: Yes    Was the patient called within 15 minutes of the scheduled appointment?:  Yes  Is this a make-up session?: No    Which session materials covered in session?:  Session 24  Patient Reported Weight (From External Justin):  188.05  Average daily steps per day via Fitbit or activity tracker measurement::  5000  Calorie Prescription::  1800  Safety Criteria Triggered:  None  Toolbox Triggered:  Adherence to diet  Adherence to diet: Health  must choose at least two interventions from list::  Use of motivational interviewing and Participant to consume only meal replacements and portion-controlled foods as prescribed  Weight Graph Stoplight Status for Dietary Adherence:  Red Light       Goals         80 <= Glucose <= 180 (pt-stated)       Blood Pressure < 130/80 (pt-stated)       Exercise at least 150 minutes per week.       HEMOGLOBIN A1C < 8.0       Increase walking/biking to 10 minutes per day (pt-stated)       Take at least one BP reading per week at various times of the day       Weight (lb) < 90.7 kg (200 lb) (pt-stated)              Additional Notes:    Patient reports Doing Well. Patient is Not Motivated with being 188 pounds today and aims to Get Below that weight next week. Patient's plans for eating this week include Buying pre-packaged meals, Packing healthy snacks, and Declining unhealthy options. Patient states that  "since returning from vacation she feels she has done well getting back to portion control and is eating mostly at home. She states that she purchased some single serving and portion controlled foods when she went to the store, but she has not been using them. She mentioned trying to use more of these types of foods and doing a "reset". For exercise, patient plans to continue meeting her average step goal of at least 5000 steps per day.    Goals:  Try using only single serving and portion controlled foods  Keep up the great work meeting your step goals    Lety Neville, MS, RD, LDN, [facility]  [employer] [facility]   [phone]        "

## 2024-07-30 ENCOUNTER — PATIENT MESSAGE (OUTPATIENT)
Dept: RESEARCH | Facility: CLINIC | Age: 49
End: 2024-07-30
Payer: COMMERCIAL

## 2024-08-05 ENCOUNTER — PATIENT MESSAGE (OUTPATIENT)
Dept: FAMILY MEDICINE | Facility: CLINIC | Age: 49
End: 2024-08-05
Payer: COMMERCIAL

## 2024-08-06 DIAGNOSIS — E78.5 HYPERLIPIDEMIA, UNSPECIFIED HYPERLIPIDEMIA TYPE: ICD-10-CM

## 2024-08-06 RX ORDER — PRAVASTATIN SODIUM 20 MG/1
20 TABLET ORAL NIGHTLY
Qty: 90 TABLET | Refills: 1 | Status: SHIPPED | OUTPATIENT
Start: 2024-08-06 | End: 2024-08-07 | Stop reason: SDUPTHER

## 2024-08-07 DIAGNOSIS — E78.5 HYPERLIPIDEMIA, UNSPECIFIED HYPERLIPIDEMIA TYPE: ICD-10-CM

## 2024-08-07 RX ORDER — PRAVASTATIN SODIUM 20 MG/1
20 TABLET ORAL NIGHTLY
Qty: 90 TABLET | Refills: 3 | Status: SHIPPED | OUTPATIENT
Start: 2024-08-07

## 2024-08-07 NOTE — TELEPHONE ENCOUNTER
No care due was identified.  [facility] Embedded Care Due Messages. Reference number: [id number].   8/07/2024 8:20:40 AM CDT

## 2024-08-07 NOTE — TELEPHONE ENCOUNTER
Refill Decision Note   [facility]  Brief Assessment and Rationale for Refill:  Approve     Medication Therapy Plan:  pharmacy change-[facility]      Comments:     Note composed:11:42 AM 08/07/2024

## 2024-08-09 ENCOUNTER — PATIENT OUTREACH (OUTPATIENT)
Dept: RESEARCH | Facility: CLINIC | Age: 49
End: 2024-08-09
Payer: COMMERCIAL

## 2024-08-21 ENCOUNTER — PATIENT MESSAGE (OUTPATIENT)
Dept: RESEARCH | Facility: CLINIC | Age: 49
End: 2024-08-21
Payer: COMMERCIAL

## 2024-09-13 ENCOUNTER — PATIENT OUTREACH (OUTPATIENT)
Dept: RESEARCH | Facility: CLINIC | Age: 49
End: 2024-09-13
Payer: COMMERCIAL

## 2024-09-13 NOTE — PROGRESS NOTES
09/13/2024  9:57 AM    [facility]   Appointment Department [facility]  Visit Type Audio (Virtual visit)    Clinic Weights   Wt Readings from Last 3 Encounters:   05/29/24 1043 87.2 kg (192 lb 5.6 oz)   04/15/24 0959 86 kg (189 lb 7.8 oz)   04/11/24 1422 90.7 kg (199 lb 15.3 oz)     Last Reported Weights No data was found      [facility] INTERVENTION CONTACT:   Method of contact:  Phone Call   or Participant-Initiated Contact?:  -initiated contact  Type of intervention Contact:  Scheduled Session  Did the participant attend session?: Yes    Was the patient called within 15 minutes of the scheduled appointment?:  Yes  Is this a make-up session?: No    Which session materials covered in session?:  Session 26  Patient Reported Weight (From External Justin):  186.51  Average daily steps per day via Fitbit or activity tracker measurement::  5000  Calorie Prescription::  1800  Safety Criteria Triggered:  None  Toolbox Triggered:  Adherence to diet  Adherence to diet: Health  must choose at least two interventions from list::  Reduce portion size and Use of motivational interviewing  Weight Graph Stoplight Status for Dietary Adherence:  Red Light       Goals         80 <= Glucose <= 180 (pt-stated)       Blood Pressure < 130/80       Exercise at least 150 minutes per week.       HEMOGLOBIN A1C < 8.0       Increase walking/biking to 10 minutes per day (pt-stated)       Take at least one BP reading per week at various times of the day       Weight (lb) < 90.7 kg (200 lb) (pt-stated)              Additional Notes:    Patient reports Doing Well. Patient is Highly Motivated with being 186 pounds today and aims to Get Below that weight next week. Patient's plans for eating this week include Packing healthy snacks and Other reducing portions . For exercise, patient plans to continue meeting her step goal.    Goals:  Continue to meet step goal  Continue to focus on portion  control and healthy snacks    Lety Neville, MS, RD, LDN, [facility]  [facility] [facility]

## 2024-09-26 ENCOUNTER — OFFICE VISIT (OUTPATIENT)
Dept: OBSTETRICS AND GYNECOLOGY | Facility: CLINIC | Age: 49
End: 2024-09-26
Payer: COMMERCIAL

## 2024-09-26 VITALS
DIASTOLIC BLOOD PRESSURE: 69 MMHG | HEIGHT: 63 IN | BODY MASS INDEX: 33.91 KG/M2 | WEIGHT: 191.38 LBS | SYSTOLIC BLOOD PRESSURE: 97 MMHG

## 2024-09-26 DIAGNOSIS — Z01.419 ROUTINE GYNECOLOGICAL EXAMINATION: Primary | ICD-10-CM

## 2024-09-26 DIAGNOSIS — Z12.4 CERVICAL CANCER SCREENING: ICD-10-CM

## 2024-09-26 PROCEDURE — 3066F NEPHROPATHY DOC TX: CPT | Mod: CPTII,S$GLB,, | Performed by: OBSTETRICS & GYNECOLOGY

## 2024-09-26 PROCEDURE — 4010F ACE/ARB THERAPY RXD/TAKEN: CPT | Mod: CPTII,S$GLB,, | Performed by: OBSTETRICS & GYNECOLOGY

## 2024-09-26 PROCEDURE — 3061F NEG MICROALBUMINURIA REV: CPT | Mod: CPTII,S$GLB,, | Performed by: OBSTETRICS & GYNECOLOGY

## 2024-09-26 PROCEDURE — 3008F BODY MASS INDEX DOCD: CPT | Mod: CPTII,S$GLB,, | Performed by: OBSTETRICS & GYNECOLOGY

## 2024-09-26 PROCEDURE — 3051F HG A1C>EQUAL 7.0%<8.0%: CPT | Mod: CPTII,S$GLB,, | Performed by: OBSTETRICS & GYNECOLOGY

## 2024-09-26 PROCEDURE — 99999 PR PBB SHADOW E&M-EST. PATIENT-LVL III: CPT | Mod: PBBFAC,,, | Performed by: OBSTETRICS & GYNECOLOGY

## 2024-09-26 PROCEDURE — 99396 PREV VISIT EST AGE 40-64: CPT | Mod: S$GLB,,, | Performed by: OBSTETRICS & GYNECOLOGY

## 2024-09-26 PROCEDURE — 3078F DIAST BP <80 MM HG: CPT | Mod: CPTII,S$GLB,, | Performed by: OBSTETRICS & GYNECOLOGY

## 2024-09-26 PROCEDURE — 3074F SYST BP LT 130 MM HG: CPT | Mod: CPTII,S$GLB,, | Performed by: OBSTETRICS & GYNECOLOGY

## 2024-09-26 PROCEDURE — 1159F MED LIST DOCD IN RCRD: CPT | Mod: CPTII,S$GLB,, | Performed by: OBSTETRICS & GYNECOLOGY

## 2024-09-26 NOTE — PROGRESS NOTES
"50 yo now perimenopausal female who presents for routine gyn visit.  Has concerns about menopause symptoms.  Has hot flashes, night sweats, mood changes. Decreased libido.  Does NOT want to use any estrogen replacement therapy.  Ok with progestrone.  Patient's last menstrual period was 04/06/2024 (exact date).  No bleeding since that time.  . No h/o STDs. Declines STD testing today.  No h/o abl pap smears.  Mammogram scheduled.  colonoscopy uptodate    ROS:  GENERAL: Denies weight gain or weight loss. Feeling well overall.   SKIN: Denies rash or lesions.   CHEST: Denies chest pain or shortness of breath.   CARDIOVASCULAR: Denies palpitations or left sided chest pain.   ABDOMEN: No abdominal pain, constipation, diarrhea, nausea, vomiting or rectal bleeding.   URINARY: No frequency, dysuria, hematuria, or burning on urination.  REPRODUCTIVE: per HPI  BREASTS:denies pain, lumps, or nipple discharge.   HEMATOLOGIC: No easy bruisability or excessive bleeding.   MUSCULOSKELETAL: Denies joint pain or swelling.   NEUROLOGIC: Denies syncope or weakness.   PSYCHIATRIC: Denies depression, anxiety or mood swings.     PE:   Vitals: BP 97/69   Ht 5' 3" (1.6 m)   Wt 86.8 kg (191 lb 5.8 oz)   LMP 04/06/2024 (Exact Date)   BMI 33.90 kg/m²   APPEARANCE: Well nourished, well developed, in no acute distress.  ABDOMEN: Soft. No tenderness or masses. No hepatosplenomegaly. No hernias.  BREASTS: Symmetrical, no skin changes or visible lesions. No palpable masses, nipple discharge or adenopathy bilaterally.  PELVIC: Normal external female genitalia without lesions. Normal hair distribution. Adequate perineal body, normal urethral meatus. Vagina moist and well rugated without lesions white discharge. Cervix pink and without lesions. No significant cystocele or rectocele. Bimanual exam showed uterus normal size, shape, position, mobile and nontender. Adnexa without masses or tenderness. Urethra and bladder normal.  EXTREMITIES: No " clubbing cyanosis or edema.    AP  Routine gyn  -s/p normal breast exam: mammogram scheduled  -s/p normal pelvic exam:   -Pap and HPV: collected  -STD testing: declined  -contraception: declined  -colon cancer screening: completed may 2022.  - hot flashes: will try OTC meds for now, if no improvement may consider progesterone therapy    F/u in 1 yr    CAITLIN Elmore MD

## 2024-10-04 ENCOUNTER — PATIENT OUTREACH (OUTPATIENT)
Dept: RESEARCH | Facility: CLINIC | Age: 49
End: 2024-10-04
Payer: COMMERCIAL

## 2024-10-04 NOTE — PROGRESS NOTES
10/04/2024  10:07 AM    [facility]   Appointment Department [facility]  Visit Type Audio (Virtual visit)    Clinic Weights   Wt Readings from Last 3 Encounters:   09/26/24 1159 86.8 kg (191 lb 5.8 oz)   05/29/24 1043 87.2 kg (192 lb 5.6 oz)   04/15/24 0959 86 kg (189 lb 7.8 oz)     Last Reported Weights No data was found      [facility] INTERVENTION CONTACT:   Method of contact:  Phone Call   or Participant-Initiated Contact?:  -initiated contact  Type of intervention Contact:  Scheduled Session  Did the participant attend session?: Yes    Was the patient called within 15 minutes of the scheduled appointment?:  Yes  Is this a make-up session?: No    Which session materials covered in session?:  Session 27  Patient Reported Weight (From External Justin):  188.7  Patient-reported weekly minutes of physical activity::  75  Calorie Prescription::  1800  Safety Criteria Triggered:  None  Toolbox Triggered:  Adherence to diet  Adherence to diet: Health  must choose at least two interventions from list::  Direct dietary modification based on individual needs, Reduce portion size and Use of motivational interviewing  Weight Graph Stoplight Status for Dietary Adherence:  Red Light       Goals         80 <= Glucose <= 180 (pt-stated)       Blood Pressure < 130/80       Exercise at least 150 minutes per week.       HEMOGLOBIN A1C < 8.0       Increase walking/biking to 10 minutes per day (pt-stated)       Take at least one BP reading per week at various times of the day       Weight (lb) < 90.7 kg (200 lb) (pt-stated)              Additional Notes:    Patient reports Doing Well. Patient is Poorly Motivated with being 188 pounds today and aims to Get Below that weight next month. Patient's plans for eating this month include Other decreasing kcal intake . Patient states that she is frustrated with her wt gain. She was told by her PCP that she is in vero-menopause and  that she should expect wt gain. Suggested decreasing kcal intake, and patient was open to trying this - sent meal 1600 kcal meal plan. For exercise, patient plans to continue walking for at least 15 minutes before work each day.    Goals:  Decrease calorie intake to 1600 kcal/day  Keep up the great work walking before work    Lety Neville, MS, RD, LDN, [facility]

## 2024-10-19 ENCOUNTER — HOSPITAL ENCOUNTER (OUTPATIENT)
Dept: RADIOLOGY | Facility: HOSPITAL | Age: 49
Discharge: HOME | End: 2024-10-19
Attending: FAMILY MEDICINE
Payer: COMMERCIAL

## 2024-10-19 DIAGNOSIS — Z12.31 ENCOUNTER FOR SCREENING MAMMOGRAM FOR BREAST CANCER: ICD-10-CM

## 2024-10-19 PROCEDURE — 77063 BREAST TOMOSYNTHESIS BI: CPT | Mod: TC

## 2024-11-01 ENCOUNTER — PATIENT OUTREACH (OUTPATIENT)
Dept: RESEARCH | Facility: CLINIC | Age: 49
End: 2024-11-01
Payer: COMMERCIAL

## 2024-11-04 DIAGNOSIS — E11.65 TYPE 2 DIABETES MELLITUS WITH HYPERGLYCEMIA, WITHOUT LONG-TERM CURRENT USE OF INSULIN: ICD-10-CM

## 2024-11-04 NOTE — TELEPHONE ENCOUNTER
Care Due:                  Date            Visit Type   Department     Provider  --------------------------------------------------------------------------------                                EP -                              PRIMARY      [facility]  Last Visit: 05-      CARE ([facility])   PENNY Sanderson                              EP -                              PRIMARY      [facility]  Next Visit: 12-      CARE ([facility])   PENNY Sanderson                                                            Last  Test          Frequency    Reason                     Performed    Due Date  --------------------------------------------------------------------------------    HBA1C.......  6 months...  empagliflozin, glipiZIDE,   05- 11-                             metFORMIN................    [employer] Embedded Care Due Messages. Reference number: [id number].   11/04/2024 5:17:10 PM CST

## 2024-11-05 RX ORDER — EMPAGLIFLOZIN 10 MG/1
10 TABLET, FILM COATED ORAL
Qty: 90 TABLET | Refills: 0 | Status: SHIPPED | OUTPATIENT
Start: 2024-11-05

## 2024-11-05 NOTE — TELEPHONE ENCOUNTER
Refill Decision Note   Reina [facility]  is requesting a refill authorization.  Brief Assessment and Rationale for Refill:  Approve     Medication Therapy Plan:  FLOS 12/21/24      Comments:     Note composed:4:16 AM 11/05/2024

## 2024-12-06 ENCOUNTER — PATIENT OUTREACH (OUTPATIENT)
Dept: RESEARCH | Facility: CLINIC | Age: 49
End: 2024-12-06
Payer: COMMERCIAL

## 2024-12-06 NOTE — PROGRESS NOTES
12/06/2024  10:01 AM    [facility]   Appointment Department [facility]  Visit Type Audio (Virtual visit)    Clinic Weights   Wt Readings from Last 3 Encounters:   09/26/24 1159 86.8 kg (191 lb 5.8 oz)   05/29/24 1043 87.2 kg (192 lb 5.6 oz)   04/15/24 0959 86 kg (189 lb 7.8 oz)     Last Reported Weights No data was found      [facility] INTERVENTION CONTACT:   Method of contact:  Phone Call   or Participant-Initiated Contact?:  -initiated contact  Type of intervention Contact:  Scheduled Session  Did the participant attend session?: Yes    Was the patient called within 15 minutes of the scheduled appointment?:  Yes  Is this a make-up session?: No    Which session materials covered in session?:  Session 29  Patient Reported Weight (From External Justin):  192.02  Patient-reported weekly minutes of physical activity::  75  Calorie Prescription::  1800  Safety Criteria Triggered:  None  Toolbox Triggered:  Adherence to diet  Adherence to diet: Health  must choose at least two interventions from list::  Reduce portion size and Use of motivational interviewing  Weight Graph Stoplight Status for Dietary Adherence:  Red Light       Goals         80 <= Glucose <= 180 (pt-stated)       Blood Pressure < 130/80       Exercise at least 150 minutes per week.       HEMOGLOBIN A1C < 8.0       Increase walking/biking to 10 minutes per day (pt-stated)       Take at least one BP reading per week at various times of the day       Weight (lb) < 90.7 kg (200 lb) (pt-stated)              Additional Notes:    Patient reports Doing Well. Patient is Highly Motivated with being 192 pounds today and aims to Get Below that weight next week. Patient's plans for eating this week include Avoiding temptation, Declining unhealthy options, and Other reducing portions . Patient states that she has done well with cutting back on mindless snacking, but since Thanksgiving she has been eating  larger portions. She states that in the last few days she has been more mindful of her portions and attributes this to the downward trend in her wt. For exercise, patient plans to continue walking for at least 15 minutes before work.     Goals:  Keep up the great work walking for at least 15 minutes in the morning  Continue to focus on eating smaller portions    Lety Neville MS, RD, LDN, [facility]  PROP-RegisterPatient Health

## 2024-12-14 ENCOUNTER — LAB VISIT (OUTPATIENT)
Dept: LAB | Facility: HOSPITAL | Age: 49
End: 2024-12-14
Attending: FAMILY MEDICINE
Payer: COMMERCIAL

## 2024-12-14 DIAGNOSIS — I10 ESSENTIAL HYPERTENSION: ICD-10-CM

## 2024-12-14 DIAGNOSIS — E66.811 CLASS 1 OBESITY WITH BODY MASS INDEX (BMI) OF 34.0 TO 34.9 IN ADULT, UNSPECIFIED OBESITY TYPE, UNSPECIFIED WHETHER SERIOUS COMORBIDITY PRESENT: ICD-10-CM

## 2024-12-14 DIAGNOSIS — E11.65 TYPE 2 DIABETES MELLITUS WITH HYPERGLYCEMIA, WITHOUT LONG-TERM CURRENT USE OF INSULIN: ICD-10-CM

## 2024-12-14 DIAGNOSIS — E78.5 HYPERLIPIDEMIA, UNSPECIFIED HYPERLIPIDEMIA TYPE: ICD-10-CM

## 2024-12-14 LAB
ALBUMIN SERPL BCP-MCNC: 3.8 G/DL (ref 3.5–5.2)
ALP SERPL-CCNC: 65 U/L (ref 40–150)
ALT SERPL W/O P-5'-P-CCNC: 29 U/L (ref 10–44)
ANION GAP SERPL CALC-SCNC: 10 MMOL/L (ref 8–16)
AST SERPL-CCNC: 24 U/L (ref 10–40)
BILIRUB SERPL-MCNC: 0.3 MG/DL (ref 0.1–1)
BUN SERPL-MCNC: 10 MG/DL (ref 6–20)
CALCIUM SERPL-MCNC: 9.3 MG/DL (ref 8.7–10.5)
CHLORIDE SERPL-SCNC: 102 MMOL/L (ref 95–110)
CHOLEST SERPL-MCNC: 185 MG/DL (ref 120–199)
CHOLEST/HDLC SERPL: 3.6 {RATIO} (ref 2–5)
CO2 SERPL-SCNC: 27 MMOL/L (ref 23–29)
CREAT SERPL-MCNC: 0.9 MG/DL (ref 0.5–1.4)
EST. GFR  (NO RACE VARIABLE): >60 ML/MIN/1.73 M^2
ESTIMATED AVG GLUCOSE: 157 MG/DL (ref 68–131)
GLUCOSE SERPL-MCNC: 80 MG/DL (ref 70–110)
HBA1C MFR BLD: 7.1 % (ref 4–5.6)
HDLC SERPL-MCNC: 51 MG/DL (ref 40–75)
HDLC SERPL: 27.6 % (ref 20–50)
LDLC SERPL CALC-MCNC: 116 MG/DL (ref 63–159)
NONHDLC SERPL-MCNC: 134 MG/DL
POTASSIUM SERPL-SCNC: 4 MMOL/L (ref 3.5–5.1)
PROT SERPL-MCNC: 7.2 G/DL (ref 6–8.4)
SODIUM SERPL-SCNC: 139 MMOL/L (ref 136–145)
TRIGL SERPL-MCNC: 90 MG/DL (ref 30–150)

## 2024-12-14 PROCEDURE — 36415 COLL VENOUS BLD VENIPUNCTURE: CPT | Mod: PO | Performed by: FAMILY MEDICINE

## 2024-12-14 PROCEDURE — 80053 COMPREHEN METABOLIC PANEL: CPT | Performed by: FAMILY MEDICINE

## 2024-12-14 PROCEDURE — 80061 LIPID PANEL: CPT | Performed by: FAMILY MEDICINE

## 2024-12-14 PROCEDURE — 83036 HEMOGLOBIN GLYCOSYLATED A1C: CPT | Performed by: FAMILY MEDICINE

## 2024-12-18 ENCOUNTER — OFFICE VISIT (OUTPATIENT)
Dept: FAMILY MEDICINE | Facility: CLINIC | Age: 49
End: 2024-12-18
Payer: COMMERCIAL

## 2024-12-18 VITALS
SYSTOLIC BLOOD PRESSURE: 96 MMHG | HEART RATE: 74 BPM | DIASTOLIC BLOOD PRESSURE: 80 MMHG | OXYGEN SATURATION: 98 % | BODY MASS INDEX: 34.88 KG/M2 | WEIGHT: 196.88 LBS | HEIGHT: 63 IN

## 2024-12-18 DIAGNOSIS — E66.811 CLASS 1 OBESITY WITH BODY MASS INDEX (BMI) OF 34.0 TO 34.9 IN ADULT, UNSPECIFIED OBESITY TYPE, UNSPECIFIED WHETHER SERIOUS COMORBIDITY PRESENT: ICD-10-CM

## 2024-12-18 DIAGNOSIS — E78.5 HYPERLIPIDEMIA, UNSPECIFIED HYPERLIPIDEMIA TYPE: ICD-10-CM

## 2024-12-18 DIAGNOSIS — I10 ESSENTIAL HYPERTENSION: Primary | ICD-10-CM

## 2024-12-18 DIAGNOSIS — E11.65 TYPE 2 DIABETES MELLITUS WITH HYPERGLYCEMIA, WITHOUT LONG-TERM CURRENT USE OF INSULIN: ICD-10-CM

## 2024-12-18 PROCEDURE — 99215 OFFICE O/P EST HI 40 MIN: CPT | Mod: S$GLB,,, | Performed by: FAMILY MEDICINE

## 2024-12-18 PROCEDURE — 1160F RVW MEDS BY RX/DR IN RCRD: CPT | Mod: CPTII,S$GLB,, | Performed by: FAMILY MEDICINE

## 2024-12-18 PROCEDURE — 3066F NEPHROPATHY DOC TX: CPT | Mod: CPTII,S$GLB,, | Performed by: FAMILY MEDICINE

## 2024-12-18 PROCEDURE — 3008F BODY MASS INDEX DOCD: CPT | Mod: CPTII,S$GLB,, | Performed by: FAMILY MEDICINE

## 2024-12-18 PROCEDURE — 3079F DIAST BP 80-89 MM HG: CPT | Mod: CPTII,S$GLB,, | Performed by: FAMILY MEDICINE

## 2024-12-18 PROCEDURE — 99999 PR PBB SHADOW E&M-EST. PATIENT-LVL IV: CPT | Mod: PBBFAC,,, | Performed by: FAMILY MEDICINE

## 2024-12-18 PROCEDURE — 3061F NEG MICROALBUMINURIA REV: CPT | Mod: CPTII,S$GLB,, | Performed by: FAMILY MEDICINE

## 2024-12-18 PROCEDURE — 3074F SYST BP LT 130 MM HG: CPT | Mod: CPTII,S$GLB,, | Performed by: FAMILY MEDICINE

## 2024-12-18 PROCEDURE — 3051F HG A1C>EQUAL 7.0%<8.0%: CPT | Mod: CPTII,S$GLB,, | Performed by: FAMILY MEDICINE

## 2024-12-18 PROCEDURE — 4010F ACE/ARB THERAPY RXD/TAKEN: CPT | Mod: CPTII,S$GLB,, | Performed by: FAMILY MEDICINE

## 2024-12-18 PROCEDURE — 1159F MED LIST DOCD IN RCRD: CPT | Mod: CPTII,S$GLB,, | Performed by: FAMILY MEDICINE

## 2024-12-18 RX ORDER — METFORMIN HYDROCHLORIDE 1000 MG/1
1000 TABLET ORAL 2 TIMES DAILY WITH MEALS
Qty: 180 TABLET | Refills: 3 | Status: SHIPPED | OUTPATIENT
Start: 2024-12-18

## 2024-12-18 RX ORDER — GLIPIZIDE 5 MG/1
5 TABLET ORAL 2 TIMES DAILY WITH MEALS
Qty: 180 TABLET | Refills: 3 | Status: SHIPPED | OUTPATIENT
Start: 2024-12-18

## 2024-12-18 RX ORDER — LISINOPRIL AND HYDROCHLOROTHIAZIDE 10; 12.5 MG/1; MG/1
1 TABLET ORAL DAILY
Qty: 90 TABLET | Refills: 3 | Status: SHIPPED | OUTPATIENT
Start: 2024-12-18

## 2024-12-19 NOTE — PROGRESS NOTES
Subjective     Patient ID: Reina ROSA [facility] is a 49 y.o. female.    Chief Complaint: Hypertension    49 years old female came to the clinic for blood pressure check.  Blood pressure today was stable.  Last A1c stable for the last 3 months.  Patient with good compliance with cholesterol regimen.  Patient with a BMI of 34 currently trying to lose weight.    Hypertension  This is a chronic problem. The current episode started more than 1 year ago. The problem is unchanged. Pertinent negatives include no chest pain, orthopnea, palpitations or peripheral edema. There are no associated agents to hypertension. Risk factors for coronary artery disease include diabetes mellitus and obesity. Past treatments include ACE inhibitors and diuretics. The current treatment provides significant improvement. There are no compliance problems.  There is no history of angina. There is no history of a hypertension causing med or a thyroid problem.   Diabetes  She presents for her follow-up diabetic visit. She has type 2 diabetes mellitus. Her disease course has been stable. There are no hypoglycemic associated symptoms. Pertinent negatives for diabetes include no chest pain, no polydipsia, no polyphagia and no polyuria. There are no hypoglycemic complications. Symptoms are stable. There are no diabetic complications. Risk factors for coronary artery disease include hypertension, diabetes mellitus and obesity. Current diabetic treatment includes oral agent (triple therapy). She is compliant with treatment all of the time. She is following a generally healthy diet. She has not had a previous visit with a dietitian. An ACE inhibitor/angiotensin II receptor blocker is being taken.   Hyperlipidemia  This is a chronic problem. The current episode started more than 1 year ago. Recent lipid tests were reviewed and are normal. Exacerbating diseases include obesity. Pertinent negatives include no chest pain. Current antihyperlipidemic  treatment includes statins. The current treatment provides significant improvement of lipids. There are no compliance problems.  Risk factors for coronary artery disease include hypertension, diabetes mellitus and obesity.     Review of Systems   Constitutional: Negative.    HENT: Negative.     Eyes: Negative.    Respiratory: Negative.     Cardiovascular:  Negative for chest pain, palpitations and orthopnea.   Gastrointestinal: Negative.    Endocrine: Negative for polydipsia, polyphagia and polyuria.   Genitourinary: Negative.    Musculoskeletal: Negative.    Neurological: Negative.    Psychiatric/Behavioral: Negative.            Objective     Physical Exam  Constitutional:       General: She is not in acute distress.     Appearance: She is well-developed. She is not diaphoretic.   HENT:      Head: Normocephalic and atraumatic.      Right Ear: External ear normal.      Left Ear: External ear normal.      Nose: Nose normal.      Mouth/Throat:      Pharynx: No oropharyngeal exudate.   Eyes:      General: No scleral icterus.        Right eye: No discharge.         Left eye: No discharge.      Conjunctiva/sclera: Conjunctivae normal.      Pupils: Pupils are equal, round, and reactive to light.   Neck:      Thyroid: No thyromegaly.      Vascular: No JVD.      Trachea: No tracheal deviation.   Cardiovascular:      Rate and Rhythm: Normal rate and regular rhythm.      Heart sounds: Normal heart sounds. No murmur heard.     No friction rub. No gallop.   Pulmonary:      Effort: Pulmonary effort is normal. No respiratory distress.      Breath sounds: Normal breath sounds. No stridor. No wheezing or rales.   Chest:      Chest wall: No tenderness.   Abdominal:      General: Bowel sounds are normal. There is no distension.      Palpations: Abdomen is soft. There is no mass.      Tenderness: There is no abdominal tenderness. There is no guarding or rebound.   Musculoskeletal:         General: No tenderness. Normal range of  motion.      Cervical back: Normal range of motion and neck supple.   Feet:      Right foot:      Protective Sensation: 10 sites tested.  10 sites sensed.      Skin integrity: No ulcer, blister, skin breakdown, erythema, warmth, callus, dry skin or fissure.      Left foot:      Protective Sensation: 10 sites tested.  10 sites sensed.      Skin integrity: No ulcer, blister, skin breakdown, erythema, warmth, callus, dry skin or fissure.   Lymphadenopathy:      Cervical: No cervical adenopathy.   Skin:     General: Skin is warm and dry.      Coloration: Skin is not pale.      Findings: No erythema or rash.   Neurological:      Mental Status: She is alert and oriented to person, place, and time.      Cranial Nerves: No cranial nerve deficit.      Motor: No abnormal muscle tone.      Coordination: Coordination normal.      Deep Tendon Reflexes: Reflexes are normal and symmetric.   Psychiatric:         Behavior: Behavior normal.         Thought Content: Thought content normal.         Judgment: Judgment normal.            Assessment and Plan     1. Essential hypertension  -     Lipid Panel; Future; Expected date: 12/18/2024  -     Comprehensive Metabolic Panel; Future; Expected date: 12/18/2024  -     lisinopriL-hydrochlorothiazide (PRINZIDE,ZESTORETIC) 10-12.5 mg per tablet; Take 1 tablet by mouth once daily.  Dispense: 90 tablet; Refill: 3    2. Class 1 obesity with body mass index (BMI) of 34.0 to 34.9 in adult, unspecified obesity type, unspecified whether serious comorbidity present  -     Lipid Panel; Future; Expected date: 12/18/2024    3. Type 2 diabetes mellitus with hyperglycemia, without long-term current use of insulin  -     Microalbumin/Creatinine Ratio, Urine; Future; Expected date: 12/18/2024  -     Lipid Panel; Future; Expected date: 12/18/2024  -     Hemoglobin A1C; Future; Expected date: 12/18/2024  -     Comprehensive Metabolic Panel; Future; Expected date: 12/18/2024  -     glipiZIDE (GLUCOTROL) 5 MG  tablet; Take 1 tablet (5 mg total) by mouth 2 (two) times daily with meals.  Dispense: 180 tablet; Refill: 3  -     empagliflozin (JARDIANCE) 10 mg tablet; Take 1 tablet (10 mg total) by mouth once daily.  Dispense: 90 tablet; Refill: 3  -     metFORMIN (GLUCOPHAGE) 1000 MG tablet; Take 1 tablet (1,000 mg total) by mouth 2 (two) times daily with meals.  Dispense: 180 tablet; Refill: 3    4. Hyperlipidemia, unspecified hyperlipidemia type  -     Lipid Panel; Future; Expected date: 12/18/2024  -     Comprehensive Metabolic Panel; Future; Expected date: 12/18/2024    Continue monitoring blood pressure at home, low sodium diet.     Continue monitoring blood sugar at home,ADA diet.   I spent a total of 41 minutes on the day of the visit.This includes face to face time and non-face to face time preparing to see the patient (eg, review of tests), obtaining and/or reviewing separately obtained history, documenting clinical information in the electronic or other health record, independently interpreting results and communicating results to the patient/family/caregiver, or care coordinator.        Follow up in about 6 months (around 6/18/2025), or if symptoms worsen or fail to improve.

## 2024-12-27 LAB
LEFT EYE DM RETINOPATHY: NEGATIVE
RIGHT EYE DM RETINOPATHY: NEGATIVE

## 2025-01-31 ENCOUNTER — PATIENT OUTREACH (OUTPATIENT)
Dept: ADMINISTRATIVE | Facility: HOSPITAL | Age: 50
End: 2025-01-31
Payer: COMMERCIAL